# Patient Record
Sex: MALE | Race: WHITE | Employment: OTHER | URBAN - METROPOLITAN AREA
[De-identification: names, ages, dates, MRNs, and addresses within clinical notes are randomized per-mention and may not be internally consistent; named-entity substitution may affect disease eponyms.]

---

## 2018-03-13 ENCOUNTER — TELEPHONE (OUTPATIENT)
Dept: CARDIOLOGY CLINIC | Age: 73
End: 2018-03-13

## 2018-03-22 ENCOUNTER — TELEPHONE (OUTPATIENT)
Dept: CARDIOLOGY CLINIC | Age: 73
End: 2018-03-22

## 2018-03-22 NOTE — TELEPHONE ENCOUNTER
Informed pt. That we don't have samples and offered him savings cards and to apply for pt. Assistance program, he will  the cards tomorrow.

## 2018-07-16 ENCOUNTER — OFFICE VISIT (OUTPATIENT)
Dept: CARDIOLOGY CLINIC | Age: 73
End: 2018-07-16
Payer: MEDICARE

## 2018-07-16 VITALS
HEART RATE: 76 BPM | BODY MASS INDEX: 25.2 KG/M2 | RESPIRATION RATE: 14 BRPM | DIASTOLIC BLOOD PRESSURE: 76 MMHG | HEIGHT: 71 IN | WEIGHT: 180 LBS | SYSTOLIC BLOOD PRESSURE: 112 MMHG

## 2018-07-16 DIAGNOSIS — I50.22 CHRONIC SYSTOLIC CHF (CONGESTIVE HEART FAILURE) (HCC): ICD-10-CM

## 2018-07-16 DIAGNOSIS — I48.20 CHRONIC ATRIAL FIBRILLATION (HCC): Primary | Chronic | ICD-10-CM

## 2018-07-16 DIAGNOSIS — I42.7 CARDIOMYOPATHY SECONDARY TO DRUG (HCC): ICD-10-CM

## 2018-07-16 PROCEDURE — 1036F TOBACCO NON-USER: CPT | Performed by: INTERNAL MEDICINE

## 2018-07-16 PROCEDURE — 3017F COLORECTAL CA SCREEN DOC REV: CPT | Performed by: INTERNAL MEDICINE

## 2018-07-16 PROCEDURE — 1123F ACP DISCUSS/DSCN MKR DOCD: CPT | Performed by: INTERNAL MEDICINE

## 2018-07-16 PROCEDURE — 1101F PT FALLS ASSESS-DOCD LE1/YR: CPT | Performed by: INTERNAL MEDICINE

## 2018-07-16 PROCEDURE — 93000 ELECTROCARDIOGRAM COMPLETE: CPT | Performed by: INTERNAL MEDICINE

## 2018-07-16 PROCEDURE — 99214 OFFICE O/P EST MOD 30 MIN: CPT | Performed by: INTERNAL MEDICINE

## 2018-07-16 PROCEDURE — G8427 DOCREV CUR MEDS BY ELIG CLIN: HCPCS | Performed by: INTERNAL MEDICINE

## 2018-07-16 PROCEDURE — 4040F PNEUMOC VAC/ADMIN/RCVD: CPT | Performed by: INTERNAL MEDICINE

## 2018-07-16 PROCEDURE — G8419 CALC BMI OUT NRM PARAM NOF/U: HCPCS | Performed by: INTERNAL MEDICINE

## 2018-07-16 NOTE — PROGRESS NOTES
[facility]  Mana Douglas M.D., BRYCE Ray. Geovany Lloyd M.D., BRYCE Contreras M.D. Jeramie Klein M.D. Richard Renner M.D. PARKER Hernandez M.D.  Chandler Grayson. 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 He is in permanent atrial fibrillation and has had transient ischemic attacks in the past.  He is on warfarin anticoagulation and has not had any complications. His most recent INR was 2.0. Coronary arteries have been documented as being normal by catheterization. He continues to stay on Entresto and has not had any recent signs of heart failure. He only takes his furosemide if his weight goes up and he has not had to take much recently. Past Medical History:  1. Hodgkins disease documented around 1993 treated with chemotherapy only. Radiation not utilized. Currently in remission. 2. Dilated cardiomyopathy presenting with CHF, Spring 2000. Cardiac catheterization, 08/2000. Normal coronary arteries with severely impaired LV systolic function. EF 15-20%. He was treated with ACE inhibitors, beta blockers, digoxin and diuretics with improvement.    3. Echo, 04/07/2005. Mild MAC with mild MR. Mild TR with RVSP 30-35 mmHg. LV systolic and diastolic dilation  mild with mild diffuse hypokinesis. EF 40-45%. Stage I diastolic dysfunction.    4. Pulmonary fibrosis.    5. Paresthesias due to chemotherapy.    6. Cigarette abuse, 45 pack years. Quit 06/2014.    7. Exercise MPS, 07/02/2007. 12.5 METS, 75% MPHR. No chest pain or diagnostic ECG changes. Moderate LVE. EF 51%. Diaphragmatic attenuation, but no evidence for ischemia. 8. Echo, 07/16/2007.  Aortic sclerosis without stenosis or insufficiency. Normal LV size, wall motion and systolic function. Stage I diastolic dysfunction. Elevated LA pressures. Marked LAE. Moderate MALISSA. Mild TR. Doppler evidence for severe PHTN (RVSP 60-65 mmHg). 9. Echo, 06/22/2009. Normal LV size with LV systolic function at the lower limits of normal. Stage II diastolic dysfunction. Elevated LA pressures. Mild RONALD. Mild TR, severe PHTN with RVSP about 65 mmHg. 10. Allergy to aspirin. 6. [facility] [address] admission, 11/07/2014 with three to four episodes of slurred speech and left weakness consistent with TIA's. Presentation CBC, BMP, TnI x1 negative. PBNP 1606. CXR normal. CT head with no significant stenosis. MRI brain with multiple acute cerebral infarcts likely embolic in etiology. 12. Echo, 11/08/2014. LVEF 50-55%. Stage III diastolic dysfunction. Severe LAE. E/E' 16. No valvular heart disease. Mild PHTN.    13. CROW, 11/10/2014. Normal EF. No valvular heart disease. Severe SEC in the LA and LUZ. Dilated LA. No obvious thrombus seen. Low escape velocity from the LUZ. 14. Event monitor, 11/15/2014. Strips available so far reveal predominantly sinus rhythm with IVCD. Multiple PAC's and PVC's. One episode of four beats of nonsustained VT.    15. Event monitor, 12/18/2014. Significant burden of AF. AF present >21 hours on 12/13/2014 through 12/16/2014. Average HR in the 80's.    16. Patient noted to be in AF/CVR during OV 01/15/2015. 17. Echo, 06/10/2016. Normal LV size with mild CLVH. Severe global hypokinesis of the LV with estimated EF between 30-35% by biplane method of disks. E/E' ratio 15. Patient is in AF. No significant valve abnormalities noted. 18. MUGA scan, 07/01/2016, mildly dilated left ventricle with global hypokinesis. Estimated ejection fraction 42%. No wall motion abnormality.    23. Entresto therapy initiated 12/22/2016.       Review of Systems:  HEENT: negative for acute visual and auditory problems  Constitutional: negative for fever and

## 2018-08-09 ENCOUNTER — TELEPHONE (OUTPATIENT)
Dept: CARDIOLOGY CLINIC | Age: 73
End: 2018-08-09

## 2018-08-21 ENCOUNTER — TELEPHONE (OUTPATIENT)
Dept: CARDIOLOGY CLINIC | Age: 73
End: 2018-08-21

## 2018-09-10 ENCOUNTER — HOSPITAL ENCOUNTER (OUTPATIENT)
Age: 73
Discharge: HOME | End: 2018-09-12
Payer: MEDICARE

## 2018-09-10 PROCEDURE — 88173 CYTOPATH EVAL FNA REPORT: CPT

## 2018-09-10 PROCEDURE — 88184 FLOWCYTOMETRY/ TC 1 MARKER: CPT

## 2018-09-10 PROCEDURE — 88305 TISSUE EXAM BY PATHOLOGIST: CPT

## 2018-09-10 PROCEDURE — 88185 FLOWCYTOMETRY/TC ADD-ON: CPT

## 2018-09-10 PROCEDURE — 88182 CELL MARKER STUDY: CPT

## 2018-09-13 LAB
Lab: NORMAL
REPORT: NORMAL
THIS TEST SENT TO: NORMAL

## 2018-09-18 ENCOUNTER — TELEPHONE (OUTPATIENT)
Dept: CARDIOLOGY CLINIC | Age: 73
End: 2018-09-18

## 2018-09-18 NOTE — TELEPHONE ENCOUNTER
Patient calling for Entresto  samples. Per Dr. Gene Monsivais, ok to give lower dose. Please contact patient when available. Thank you.

## 2018-10-10 RX ORDER — SACUBITRIL AND VALSARTAN 97; 103 MG/1; MG/1
TABLET, FILM COATED ORAL
Qty: 60 TABLET | Refills: 5 | Status: SHIPPED | OUTPATIENT
Start: 2018-10-10 | End: 2019-04-24 | Stop reason: SDUPTHER

## 2018-10-26 ENCOUNTER — TELEPHONE (OUTPATIENT)
Dept: CARDIOLOGY CLINIC | Age: 73
End: 2018-10-26

## 2018-11-01 RX ORDER — WARFARIN SODIUM 5 MG/1
TABLET ORAL
COMMUNITY
Start: 2018-10-13

## 2018-11-08 ENCOUNTER — ANESTHESIA EVENT (OUTPATIENT)
Dept: OPERATING ROOM | Age: 73
End: 2018-11-08
Payer: MEDICARE

## 2018-11-08 ENCOUNTER — ANESTHESIA (OUTPATIENT)
Dept: OPERATING ROOM | Age: 73
End: 2018-11-08
Payer: MEDICARE

## 2018-11-08 ENCOUNTER — HOSPITAL ENCOUNTER (OUTPATIENT)
Age: 73
Setting detail: OUTPATIENT SURGERY
Discharge: HOME | End: 2018-11-08
Attending: OTOLARYNGOLOGY | Admitting: OTOLARYNGOLOGY
Payer: MEDICARE

## 2018-11-08 VITALS
TEMPERATURE: 98.6 F | HEART RATE: 128 BPM | HEIGHT: 71 IN | BODY MASS INDEX: 24.5 KG/M2 | WEIGHT: 175 LBS | DIASTOLIC BLOOD PRESSURE: 72 MMHG | SYSTOLIC BLOOD PRESSURE: 108 MMHG | RESPIRATION RATE: 18 BRPM | OXYGEN SATURATION: 95 %

## 2018-11-08 VITALS — TEMPERATURE: 97.7 F | DIASTOLIC BLOOD PRESSURE: 94 MMHG | OXYGEN SATURATION: 96 % | SYSTOLIC BLOOD PRESSURE: 134 MMHG

## 2018-11-08 DIAGNOSIS — G89.18 POST-OP PAIN: Primary | ICD-10-CM

## 2018-11-08 LAB
ANION GAP SERPL CALCULATED.3IONS-SCNC: 6 MMOL/L (ref 7–16)
APTT: 26.5 SEC (ref 24.5–35.1)
BUN BLDV-MCNC: 16 MG/DL (ref 8–23)
CALCIUM SERPL-MCNC: 8.3 MG/DL (ref 8.6–10.2)
CHLORIDE BLD-SCNC: 104 MMOL/L (ref 98–107)
CO2: 31 MMOL/L (ref 22–29)
CREAT SERPL-MCNC: 0.9 MG/DL (ref 0.7–1.2)
GFR AFRICAN AMERICAN: >60
GFR NON-AFRICAN AMERICAN: >60 ML/MIN/1.73
GLUCOSE BLD-MCNC: 86 MG/DL (ref 74–99)
HCT VFR BLD CALC: 42.2 % (ref 37–54)
HEMOGLOBIN: 13.8 G/DL (ref 12.5–16.5)
INR BLD: 1.3
MCH RBC QN AUTO: 32.7 PG (ref 26–35)
MCHC RBC AUTO-ENTMCNC: 32.7 % (ref 32–34.5)
MCV RBC AUTO: 100 FL (ref 80–99.9)
METER GLUCOSE: 78 MG/DL (ref 74–99)
PDW BLD-RTO: 14.6 FL (ref 11.5–15)
PLATELET # BLD: 182 E9/L (ref 130–450)
PMV BLD AUTO: 10.1 FL (ref 7–12)
POTASSIUM SERPL-SCNC: 4.1 MMOL/L (ref 3.5–5)
PROTHROMBIN TIME: 14.1 SEC (ref 9.3–12.4)
RBC # BLD: 4.22 E12/L (ref 3.8–5.8)
SODIUM BLD-SCNC: 141 MMOL/L (ref 132–146)
WBC # BLD: 7.6 E9/L (ref 4.5–11.5)

## 2018-11-08 PROCEDURE — 85730 THROMBOPLASTIN TIME PARTIAL: CPT

## 2018-11-08 PROCEDURE — 2500000003 HC RX 250 WO HCPCS: Performed by: OTOLARYNGOLOGY

## 2018-11-08 PROCEDURE — 2580000003 HC RX 258: Performed by: OTOLARYNGOLOGY

## 2018-11-08 PROCEDURE — 80048 BASIC METABOLIC PNL TOTAL CA: CPT

## 2018-11-08 PROCEDURE — 3600000013 HC SURGERY LEVEL 3 ADDTL 15MIN: Performed by: OTOLARYNGOLOGY

## 2018-11-08 PROCEDURE — 6360000002 HC RX W HCPCS: Performed by: NURSE ANESTHETIST, CERTIFIED REGISTERED

## 2018-11-08 PROCEDURE — 88342 IMHCHEM/IMCYTCHM 1ST ANTB: CPT

## 2018-11-08 PROCEDURE — 6360000002 HC RX W HCPCS: Performed by: OTOLARYNGOLOGY

## 2018-11-08 PROCEDURE — 85027 COMPLETE CBC AUTOMATED: CPT

## 2018-11-08 PROCEDURE — 88313 SPECIAL STAINS GROUP 2: CPT

## 2018-11-08 PROCEDURE — 3700000000 HC ANESTHESIA ATTENDED CARE: Performed by: OTOLARYNGOLOGY

## 2018-11-08 PROCEDURE — 88185 FLOWCYTOMETRY/TC ADD-ON: CPT

## 2018-11-08 PROCEDURE — 88184 FLOWCYTOMETRY/ TC 1 MARKER: CPT

## 2018-11-08 PROCEDURE — 6370000000 HC RX 637 (ALT 250 FOR IP)

## 2018-11-08 PROCEDURE — 2500000003 HC RX 250 WO HCPCS: Performed by: NURSE ANESTHETIST, CERTIFIED REGISTERED

## 2018-11-08 PROCEDURE — 6370000000 HC RX 637 (ALT 250 FOR IP): Performed by: OTOLARYNGOLOGY

## 2018-11-08 PROCEDURE — 2720000010 HC SURG SUPPLY STERILE: Performed by: OTOLARYNGOLOGY

## 2018-11-08 PROCEDURE — 3700000001 HC ADD 15 MINUTES (ANESTHESIA): Performed by: OTOLARYNGOLOGY

## 2018-11-08 PROCEDURE — 7100000011 HC PHASE II RECOVERY - ADDTL 15 MIN: Performed by: OTOLARYNGOLOGY

## 2018-11-08 PROCEDURE — 88331 PATH CONSLTJ SURG 1 BLK 1SPC: CPT

## 2018-11-08 PROCEDURE — 88182 CELL MARKER STUDY: CPT

## 2018-11-08 PROCEDURE — L0150 CERV SEMI-RIG ADJ MOLDED CHN: HCPCS | Performed by: OTOLARYNGOLOGY

## 2018-11-08 PROCEDURE — 3600000003 HC SURGERY LEVEL 3 BASE: Performed by: OTOLARYNGOLOGY

## 2018-11-08 PROCEDURE — 88307 TISSUE EXAM BY PATHOLOGIST: CPT

## 2018-11-08 PROCEDURE — 82962 GLUCOSE BLOOD TEST: CPT

## 2018-11-08 PROCEDURE — 2709999900 HC NON-CHARGEABLE SUPPLY: Performed by: OTOLARYNGOLOGY

## 2018-11-08 PROCEDURE — 2580000003 HC RX 258: Performed by: NURSE ANESTHETIST, CERTIFIED REGISTERED

## 2018-11-08 PROCEDURE — 85610 PROTHROMBIN TIME: CPT

## 2018-11-08 PROCEDURE — 7100000001 HC PACU RECOVERY - ADDTL 15 MIN: Performed by: OTOLARYNGOLOGY

## 2018-11-08 PROCEDURE — 7100000010 HC PHASE II RECOVERY - FIRST 15 MIN: Performed by: OTOLARYNGOLOGY

## 2018-11-08 PROCEDURE — 7100000000 HC PACU RECOVERY - FIRST 15 MIN: Performed by: OTOLARYNGOLOGY

## 2018-11-08 PROCEDURE — 36415 COLL VENOUS BLD VENIPUNCTURE: CPT

## 2018-11-08 PROCEDURE — 88341 IMHCHEM/IMCYTCHM EA ADD ANTB: CPT

## 2018-11-08 RX ORDER — TRAMADOL HYDROCHLORIDE 50 MG/1
50 TABLET ORAL EVERY 6 HOURS PRN
Qty: 20 TABLET | Refills: 0 | Status: SHIPPED | OUTPATIENT
Start: 2018-11-08 | End: 2018-11-13

## 2018-11-08 RX ORDER — ROCURONIUM BROMIDE 10 MG/ML
INJECTION, SOLUTION INTRAVENOUS PRN
Status: DISCONTINUED | OUTPATIENT
Start: 2018-11-08 | End: 2018-11-08 | Stop reason: SDUPTHER

## 2018-11-08 RX ORDER — SODIUM CHLORIDE 0.9 % (FLUSH) 0.9 %
10 SYRINGE (ML) INJECTION PRN
Status: DISCONTINUED | OUTPATIENT
Start: 2018-11-08 | End: 2018-11-08 | Stop reason: HOSPADM

## 2018-11-08 RX ORDER — EPHEDRINE SULFATE/0.9% NACL/PF 50 MG/5 ML
SYRINGE (ML) INTRAVENOUS PRN
Status: DISCONTINUED | OUTPATIENT
Start: 2018-11-08 | End: 2018-11-08 | Stop reason: SDUPTHER

## 2018-11-08 RX ORDER — LIDOCAINE HYDROCHLORIDE AND EPINEPHRINE 10; 10 MG/ML; UG/ML
INJECTION, SOLUTION INFILTRATION; PERINEURAL PRN
Status: DISCONTINUED | OUTPATIENT
Start: 2018-11-08 | End: 2018-11-08 | Stop reason: HOSPADM

## 2018-11-08 RX ORDER — DEXAMETHASONE SODIUM PHOSPHATE 4 MG/ML
INJECTION, SOLUTION INTRA-ARTICULAR; INTRALESIONAL; INTRAMUSCULAR; INTRAVENOUS; SOFT TISSUE PRN
Status: DISCONTINUED | OUTPATIENT
Start: 2018-11-08 | End: 2018-11-08 | Stop reason: SDUPTHER

## 2018-11-08 RX ORDER — SODIUM CHLORIDE, SODIUM LACTATE, POTASSIUM CHLORIDE, CALCIUM CHLORIDE 600; 310; 30; 20 MG/100ML; MG/100ML; MG/100ML; MG/100ML
INJECTION, SOLUTION INTRAVENOUS CONTINUOUS
Status: DISCONTINUED | OUTPATIENT
Start: 2018-11-08 | End: 2018-11-08 | Stop reason: HOSPADM

## 2018-11-08 RX ORDER — ONDANSETRON 2 MG/ML
INJECTION INTRAMUSCULAR; INTRAVENOUS PRN
Status: DISCONTINUED | OUTPATIENT
Start: 2018-11-08 | End: 2018-11-08 | Stop reason: SDUPTHER

## 2018-11-08 RX ORDER — MEPERIDINE HYDROCHLORIDE 25 MG/ML
12.5 INJECTION INTRAMUSCULAR; INTRAVENOUS; SUBCUTANEOUS EVERY 5 MIN PRN
Status: DISCONTINUED | OUTPATIENT
Start: 2018-11-08 | End: 2018-11-08 | Stop reason: HOSPADM

## 2018-11-08 RX ORDER — GLYCOPYRROLATE 0.2 MG/ML
INJECTION INTRAMUSCULAR; INTRAVENOUS PRN
Status: DISCONTINUED | OUTPATIENT
Start: 2018-11-08 | End: 2018-11-08 | Stop reason: SDUPTHER

## 2018-11-08 RX ORDER — ONDANSETRON 2 MG/ML
4 INJECTION INTRAMUSCULAR; INTRAVENOUS
Status: DISCONTINUED | OUTPATIENT
Start: 2018-11-08 | End: 2018-11-08 | Stop reason: HOSPADM

## 2018-11-08 RX ORDER — LIDOCAINE HYDROCHLORIDE 20 MG/ML
INJECTION, SOLUTION INFILTRATION; PERINEURAL PRN
Status: DISCONTINUED | OUTPATIENT
Start: 2018-11-08 | End: 2018-11-08 | Stop reason: SDUPTHER

## 2018-11-08 RX ORDER — FENTANYL CITRATE 50 UG/ML
INJECTION, SOLUTION INTRAMUSCULAR; INTRAVENOUS PRN
Status: DISCONTINUED | OUTPATIENT
Start: 2018-11-08 | End: 2018-11-08 | Stop reason: SDUPTHER

## 2018-11-08 RX ORDER — CEFAZOLIN SODIUM 2 G/50ML
2 SOLUTION INTRAVENOUS
Status: COMPLETED | OUTPATIENT
Start: 2018-11-08 | End: 2018-11-08

## 2018-11-08 RX ORDER — DIAPER,BRIEF,INFANT-TODD,DISP
EACH MISCELLANEOUS PRN
Status: DISCONTINUED | OUTPATIENT
Start: 2018-11-08 | End: 2018-11-08 | Stop reason: HOSPADM

## 2018-11-08 RX ORDER — CEPHALEXIN 500 MG/1
500 CAPSULE ORAL 3 TIMES DAILY
Qty: 21 CAPSULE | Refills: 0 | Status: SHIPPED | OUTPATIENT
Start: 2018-11-08 | End: 2018-11-15

## 2018-11-08 RX ORDER — MIDAZOLAM HYDROCHLORIDE 1 MG/ML
INJECTION INTRAMUSCULAR; INTRAVENOUS PRN
Status: DISCONTINUED | OUTPATIENT
Start: 2018-11-08 | End: 2018-11-08 | Stop reason: SDUPTHER

## 2018-11-08 RX ORDER — ONDANSETRON 4 MG/1
4 TABLET, FILM COATED ORAL DAILY PRN
Qty: 21 TABLET | Refills: 0 | Status: SHIPPED | OUTPATIENT
Start: 2018-11-08 | End: 2019-02-04 | Stop reason: ALTCHOICE

## 2018-11-08 RX ORDER — FENTANYL CITRATE 50 UG/ML
25 INJECTION, SOLUTION INTRAMUSCULAR; INTRAVENOUS EVERY 5 MIN PRN
Status: DISCONTINUED | OUTPATIENT
Start: 2018-11-08 | End: 2018-11-08 | Stop reason: HOSPADM

## 2018-11-08 RX ORDER — PROPOFOL 10 MG/ML
INJECTION, EMULSION INTRAVENOUS PRN
Status: DISCONTINUED | OUTPATIENT
Start: 2018-11-08 | End: 2018-11-08 | Stop reason: SDUPTHER

## 2018-11-08 RX ORDER — FENTANYL CITRATE 50 UG/ML
50 INJECTION, SOLUTION INTRAMUSCULAR; INTRAVENOUS EVERY 5 MIN PRN
Status: DISCONTINUED | OUTPATIENT
Start: 2018-11-08 | End: 2018-11-08 | Stop reason: HOSPADM

## 2018-11-08 RX ORDER — SODIUM CHLORIDE 0.9 % (FLUSH) 0.9 %
10 SYRINGE (ML) INJECTION EVERY 12 HOURS SCHEDULED
Status: DISCONTINUED | OUTPATIENT
Start: 2018-11-08 | End: 2018-11-08 | Stop reason: HOSPADM

## 2018-11-08 RX ORDER — SODIUM CHLORIDE 9 MG/ML
INJECTION, SOLUTION INTRAVENOUS CONTINUOUS PRN
Status: DISCONTINUED | OUTPATIENT
Start: 2018-11-08 | End: 2018-11-08 | Stop reason: SDUPTHER

## 2018-11-08 RX ADMIN — Medication 10 MG: at 09:51

## 2018-11-08 RX ADMIN — PHENYLEPHRINE HYDROCHLORIDE 100 MCG: 10 INJECTION INTRAVENOUS at 09:46

## 2018-11-08 RX ADMIN — FENTANYL CITRATE 50 MCG: 50 INJECTION, SOLUTION INTRAMUSCULAR; INTRAVENOUS at 10:18

## 2018-11-08 RX ADMIN — SODIUM CHLORIDE: 9 INJECTION, SOLUTION INTRAVENOUS at 08:52

## 2018-11-08 RX ADMIN — ONDANSETRON HYDROCHLORIDE 4 MG: 2 INJECTION, SOLUTION INTRAMUSCULAR; INTRAVENOUS at 10:45

## 2018-11-08 RX ADMIN — GLYCOPYRROLATE 0.6 MG: 0.2 INJECTION, SOLUTION INTRAMUSCULAR; INTRAVENOUS at 10:55

## 2018-11-08 RX ADMIN — MIDAZOLAM HYDROCHLORIDE 2 MG: 1 INJECTION, SOLUTION INTRAMUSCULAR; INTRAVENOUS at 08:52

## 2018-11-08 RX ADMIN — PHENYLEPHRINE HYDROCHLORIDE 100 MCG: 10 INJECTION INTRAVENOUS at 09:48

## 2018-11-08 RX ADMIN — ROCURONIUM BROMIDE 40 MG: 10 SOLUTION INTRAVENOUS at 09:05

## 2018-11-08 RX ADMIN — LIDOCAINE HYDROCHLORIDE 60 MG: 20 INJECTION, SOLUTION INFILTRATION; PERINEURAL at 09:05

## 2018-11-08 RX ADMIN — Medication 10 MG: at 10:36

## 2018-11-08 RX ADMIN — PHENYLEPHRINE HYDROCHLORIDE 100 MCG: 10 INJECTION INTRAVENOUS at 09:27

## 2018-11-08 RX ADMIN — CEFAZOLIN SODIUM 2 G: 2 SOLUTION INTRAVENOUS at 08:52

## 2018-11-08 RX ADMIN — Medication 10 MG: at 09:57

## 2018-11-08 RX ADMIN — SODIUM CHLORIDE: 9 INJECTION, SOLUTION INTRAVENOUS at 10:58

## 2018-11-08 RX ADMIN — FENTANYL CITRATE 50 MCG: 50 INJECTION, SOLUTION INTRAMUSCULAR; INTRAVENOUS at 09:05

## 2018-11-08 RX ADMIN — PHENYLEPHRINE HYDROCHLORIDE 100 MCG: 10 INJECTION INTRAVENOUS at 09:21

## 2018-11-08 RX ADMIN — PROPOFOL 150 MG: 10 INJECTION, EMULSION INTRAVENOUS at 09:05

## 2018-11-08 RX ADMIN — SODIUM CHLORIDE, POTASSIUM CHLORIDE, SODIUM LACTATE AND CALCIUM CHLORIDE: 600; 310; 30; 20 INJECTION, SOLUTION INTRAVENOUS at 07:46

## 2018-11-08 RX ADMIN — Medication 10 MG: at 10:34

## 2018-11-08 RX ADMIN — Medication 10 MG: at 09:55

## 2018-11-08 RX ADMIN — DEXAMETHASONE SODIUM PHOSPHATE 8 MG: 4 INJECTION, SOLUTION INTRAMUSCULAR; INTRAVENOUS at 09:05

## 2018-11-08 ASSESSMENT — PULMONARY FUNCTION TESTS
PIF_VALUE: 0
PIF_VALUE: 11
PIF_VALUE: 0
PIF_VALUE: 0
PIF_VALUE: 17
PIF_VALUE: 16
PIF_VALUE: 16
PIF_VALUE: 9
PIF_VALUE: 0
PIF_VALUE: 15
PIF_VALUE: 16
PIF_VALUE: 15
PIF_VALUE: 17
PIF_VALUE: 14
PIF_VALUE: 3
PIF_VALUE: 20
PIF_VALUE: 17
PIF_VALUE: 18
PIF_VALUE: 11
PIF_VALUE: 2
PIF_VALUE: 18
PIF_VALUE: 15
PIF_VALUE: 16
PIF_VALUE: 14
PIF_VALUE: 0
PIF_VALUE: 15
PIF_VALUE: 17
PIF_VALUE: 11
PIF_VALUE: 13
PIF_VALUE: 18
PIF_VALUE: 18
PIF_VALUE: 15
PIF_VALUE: 14
PIF_VALUE: 15
PIF_VALUE: 11
PIF_VALUE: 28
PIF_VALUE: 3
PIF_VALUE: 19
PIF_VALUE: 15
PIF_VALUE: 1
PIF_VALUE: 11
PIF_VALUE: 13
PIF_VALUE: 11
PIF_VALUE: 17
PIF_VALUE: 16
PIF_VALUE: 15
PIF_VALUE: 16
PIF_VALUE: 14
PIF_VALUE: 17
PIF_VALUE: 11
PIF_VALUE: 18
PIF_VALUE: 16
PIF_VALUE: 11
PIF_VALUE: 15
PIF_VALUE: 11
PIF_VALUE: 19
PIF_VALUE: 17
PIF_VALUE: 17
PIF_VALUE: 11
PIF_VALUE: 15
PIF_VALUE: 16
PIF_VALUE: 11
PIF_VALUE: 14
PIF_VALUE: 0
PIF_VALUE: 11
PIF_VALUE: 16
PIF_VALUE: 0
PIF_VALUE: 0
PIF_VALUE: 11
PIF_VALUE: 10
PIF_VALUE: 20
PIF_VALUE: 0
PIF_VALUE: 11
PIF_VALUE: 14
PIF_VALUE: 15
PIF_VALUE: 16
PIF_VALUE: 15
PIF_VALUE: 3
PIF_VALUE: 11
PIF_VALUE: 15
PIF_VALUE: 11
PIF_VALUE: 17
PIF_VALUE: 11
PIF_VALUE: 22
PIF_VALUE: 16
PIF_VALUE: 15
PIF_VALUE: 17
PIF_VALUE: 0
PIF_VALUE: 18
PIF_VALUE: 11
PIF_VALUE: 14
PIF_VALUE: 11
PIF_VALUE: 11
PIF_VALUE: 17
PIF_VALUE: 16
PIF_VALUE: 18
PIF_VALUE: 14
PIF_VALUE: 11
PIF_VALUE: 15
PIF_VALUE: 15
PIF_VALUE: 14
PIF_VALUE: 16
PIF_VALUE: 15
PIF_VALUE: 2
PIF_VALUE: 11
PIF_VALUE: 15
PIF_VALUE: 1
PIF_VALUE: 17
PIF_VALUE: 19
PIF_VALUE: 1
PIF_VALUE: 15
PIF_VALUE: 11
PIF_VALUE: 16
PIF_VALUE: 11
PIF_VALUE: 18
PIF_VALUE: 14
PIF_VALUE: 20
PIF_VALUE: 17
PIF_VALUE: 17
PIF_VALUE: 16
PIF_VALUE: 15
PIF_VALUE: 11
PIF_VALUE: 15
PIF_VALUE: 15
PIF_VALUE: 0
PIF_VALUE: 11
PIF_VALUE: 1
PIF_VALUE: 0
PIF_VALUE: 10
PIF_VALUE: 12
PIF_VALUE: 14
PIF_VALUE: 15
PIF_VALUE: 0
PIF_VALUE: 13
PIF_VALUE: 17
PIF_VALUE: 1
PIF_VALUE: 18
PIF_VALUE: 11
PIF_VALUE: 2
PIF_VALUE: 15
PIF_VALUE: 11
PIF_VALUE: 0
PIF_VALUE: 16
PIF_VALUE: 15
PIF_VALUE: 14
PIF_VALUE: 11
PIF_VALUE: 13
PIF_VALUE: 15
PIF_VALUE: 19
PIF_VALUE: 11
PIF_VALUE: 15
PIF_VALUE: 15

## 2018-11-08 ASSESSMENT — PAIN - FUNCTIONAL ASSESSMENT: PAIN_FUNCTIONAL_ASSESSMENT: 0-10

## 2018-11-08 ASSESSMENT — PAIN SCALES - GENERAL
PAINLEVEL_OUTOF10: 0

## 2018-11-08 NOTE — ANESTHESIA PRE PROCEDURE
consumption: 11/07/18                        Date of last solid food consumption: 11/07/18    BMI:   Wt Readings from Last 3 Encounters:   11/01/18 175 lb (79.4 kg)   07/16/18 180 lb (81.6 kg)   01/31/18 179 lb (81.2 kg)     Body mass index is 24.41 kg/m². CBC:   Lab Results   Component Value Date    WBC 7.6 11/08/2018    RBC 4.22 11/08/2018    HGB 13.8 11/08/2018    HCT 42.2 11/08/2018    .0 11/08/2018    RDW 14.6 11/08/2018     11/08/2018       CMP:   Lab Results   Component Value Date     11/09/2014    K 4.0 11/09/2014    CL 99 11/09/2014    CO2 29 11/09/2014    BUN 13 11/09/2014    CREATININE 0.9 11/09/2014    GFRAA >60 11/09/2014    LABGLOM >60 11/09/2014    GLUCOSE 107 11/09/2014    PROT 6.9 11/09/2014    CALCIUM 9.0 11/09/2014    BILITOT 0.6 11/09/2014    ALKPHOS 81 11/09/2014    AST 13 11/09/2014    ALT 10 11/09/2014       POC Tests: No results for input(s): POCGLU, POCNA, POCK, POCCL, POCBUN, POCHEMO, POCHCT in the last 72 hours.     Coags:   Lab Results   Component Value Date    PROTIME 11.4 11/11/2014    INR 1.1 11/11/2014    APTT 27.0 11/07/2014       HCG (If Applicable): No results found for: PREGTESTUR, PREGSERUM, HCG, HCGQUANT     ABGs: No results found for: PHART, PO2ART, MHX9SRG, DGU9WQO, BEART, D5ZCSUYM     Type & Screen (If Applicable):  No results found for: LABABO, [address]    Anesthesia Evaluation  Patient summary reviewed  Airway: Mallampati: III  TM distance: >3 FB   Neck ROM: full  Mouth opening: > = 3 FB Dental: normal exam         Pulmonary:Negative Pulmonary ROS and normal exam                               Cardiovascular:    (+) hypertension:, CHF:,                 Cleared by cardiology     Beta Blocker:  Dose within 24 Hrs         Neuro/Psych:   (+) TIA,             GI/Hepatic/Renal:             Endo/Other:    (+) DiabetesType II DM, using insulin, hypothyroidism::., .                 Abdominal:           Vascular:                                        Anesthesia Plan      general     ASA 3       Induction: intravenous. Anesthetic plan and risks discussed with patient. Plan discussed with CRNA.                   Teri Gee MD   11/8/2018

## 2018-11-08 NOTE — H&P
The H&P has been reviewed, the patient has been examined, and I concur with the findings of the 11/2018 H&P.

## 2018-11-15 LAB
Lab: NORMAL
REPORT: NORMAL
THIS TEST SENT TO: NORMAL

## 2018-11-19 ENCOUNTER — TELEPHONE (OUTPATIENT)
Dept: CARDIOLOGY CLINIC | Age: 73
End: 2018-11-19

## 2018-12-13 ENCOUNTER — TELEPHONE (OUTPATIENT)
Dept: ADMINISTRATIVE | Age: 73
End: 2018-12-13

## 2019-01-03 ENCOUNTER — TELEPHONE (OUTPATIENT)
Dept: CARDIOLOGY CLINIC | Age: 74
End: 2019-01-03

## 2019-01-09 ENCOUNTER — TELEPHONE (OUTPATIENT)
Dept: CARDIOLOGY CLINIC | Age: 74
End: 2019-01-09

## 2019-02-04 ENCOUNTER — OFFICE VISIT (OUTPATIENT)
Dept: CARDIOLOGY CLINIC | Age: 74
End: 2019-02-04
Payer: MEDICARE

## 2019-02-04 VITALS
RESPIRATION RATE: 16 BRPM | HEIGHT: 71 IN | BODY MASS INDEX: 26.53 KG/M2 | HEART RATE: 81 BPM | WEIGHT: 189.5 LBS | SYSTOLIC BLOOD PRESSURE: 136 MMHG | DIASTOLIC BLOOD PRESSURE: 74 MMHG

## 2019-02-04 DIAGNOSIS — I42.7 CARDIOMYOPATHY SECONDARY TO DRUG (HCC): ICD-10-CM

## 2019-02-04 DIAGNOSIS — I48.20 CHRONIC ATRIAL FIBRILLATION (HCC): Primary | Chronic | ICD-10-CM

## 2019-02-04 DIAGNOSIS — I50.22 CHRONIC SYSTOLIC CHF (CONGESTIVE HEART FAILURE) (HCC): ICD-10-CM

## 2019-02-04 PROCEDURE — 3017F COLORECTAL CA SCREEN DOC REV: CPT | Performed by: INTERNAL MEDICINE

## 2019-02-04 PROCEDURE — 1123F ACP DISCUSS/DSCN MKR DOCD: CPT | Performed by: INTERNAL MEDICINE

## 2019-02-04 PROCEDURE — 4040F PNEUMOC VAC/ADMIN/RCVD: CPT | Performed by: INTERNAL MEDICINE

## 2019-02-04 PROCEDURE — G8484 FLU IMMUNIZE NO ADMIN: HCPCS | Performed by: INTERNAL MEDICINE

## 2019-02-04 PROCEDURE — 99213 OFFICE O/P EST LOW 20 MIN: CPT | Performed by: INTERNAL MEDICINE

## 2019-02-04 PROCEDURE — G8427 DOCREV CUR MEDS BY ELIG CLIN: HCPCS | Performed by: INTERNAL MEDICINE

## 2019-02-04 PROCEDURE — 1101F PT FALLS ASSESS-DOCD LE1/YR: CPT | Performed by: INTERNAL MEDICINE

## 2019-02-04 PROCEDURE — 1036F TOBACCO NON-USER: CPT | Performed by: INTERNAL MEDICINE

## 2019-02-04 PROCEDURE — 93000 ELECTROCARDIOGRAM COMPLETE: CPT | Performed by: INTERNAL MEDICINE

## 2019-02-04 PROCEDURE — G8419 CALC BMI OUT NRM PARAM NOF/U: HCPCS | Performed by: INTERNAL MEDICINE

## 2019-02-04 RX ORDER — LEVOTHYROXINE SODIUM 0.07 MG/1
75 TABLET ORAL DAILY
COMMUNITY
Start: 2019-01-10

## 2019-02-04 RX ORDER — PEN NEEDLE, DIABETIC 31 GX3/16"
NEEDLE, DISPOSABLE MISCELLANEOUS
Refills: 3 | COMMUNITY
Start: 2019-01-15 | End: 2022-07-23

## 2019-02-04 RX ORDER — LANCETS 33 GAUGE
EACH MISCELLANEOUS
Refills: 0 | COMMUNITY
Start: 2019-01-22 | End: 2022-07-23

## 2019-02-06 ENCOUNTER — TELEPHONE (OUTPATIENT)
Dept: CARDIOLOGY CLINIC | Age: 74
End: 2019-02-06

## 2019-03-08 ENCOUNTER — TELEPHONE (OUTPATIENT)
Dept: CARDIOLOGY CLINIC | Age: 74
End: 2019-03-08

## 2019-07-29 ENCOUNTER — OFFICE VISIT (OUTPATIENT)
Dept: CARDIOLOGY CLINIC | Age: 74
End: 2019-07-29
Payer: MEDICARE

## 2019-07-29 VITALS
HEART RATE: 61 BPM | HEIGHT: 71 IN | DIASTOLIC BLOOD PRESSURE: 80 MMHG | BODY MASS INDEX: 26.1 KG/M2 | RESPIRATION RATE: 16 BRPM | SYSTOLIC BLOOD PRESSURE: 118 MMHG | WEIGHT: 186.4 LBS

## 2019-07-29 DIAGNOSIS — I48.20 CHRONIC ATRIAL FIBRILLATION (HCC): Primary | ICD-10-CM

## 2019-07-29 PROCEDURE — 93000 ELECTROCARDIOGRAM COMPLETE: CPT | Performed by: INTERNAL MEDICINE

## 2019-07-29 PROCEDURE — 4040F PNEUMOC VAC/ADMIN/RCVD: CPT | Performed by: INTERNAL MEDICINE

## 2019-07-29 PROCEDURE — 99214 OFFICE O/P EST MOD 30 MIN: CPT | Performed by: INTERNAL MEDICINE

## 2019-07-29 PROCEDURE — 3017F COLORECTAL CA SCREEN DOC REV: CPT | Performed by: INTERNAL MEDICINE

## 2019-07-29 PROCEDURE — G8427 DOCREV CUR MEDS BY ELIG CLIN: HCPCS | Performed by: INTERNAL MEDICINE

## 2019-07-29 PROCEDURE — G8419 CALC BMI OUT NRM PARAM NOF/U: HCPCS | Performed by: INTERNAL MEDICINE

## 2019-07-29 PROCEDURE — 1123F ACP DISCUSS/DSCN MKR DOCD: CPT | Performed by: INTERNAL MEDICINE

## 2019-07-29 PROCEDURE — 1036F TOBACCO NON-USER: CPT | Performed by: INTERNAL MEDICINE

## 2019-07-29 NOTE — PROGRESS NOTES
valvular heart disease. Mild PHTN.    13. CROW, 11/10/2014. Normal EF. No valvular heart disease. Severe SEC in the LA and LUZ. Dilated LA. No obvious thrombus seen. Low escape velocity from the LUZ. 14. Event monitor, 11/15/2014. Strips available so far reveal predominantly sinus rhythm with IVCD. Multiple PAC's and PVC's. One episode of four beats of nonsustained VT.    15. Event monitor, 12/18/2014. Significant burden of AF. AF present >21 hours on 12/13/2014 through 12/16/2014. Average HR in the 80's.    16. Patient noted to be in AF/CVR during OV 01/15/2015. 17. Echo, 06/10/2016. Normal LV size with mild CLVH. Severe global hypokinesis of the LV with estimated EF between 30-35% by biplane method of disks. E/E' ratio 15. Patient is in AF. No significant valve abnormalities noted. 18. MUGA scan, 07/01/2016, mildly dilated left ventricle with global hypokinesis. Estimated ejection fraction 42%. No wall motion abnormality. 19. Entresto therapy initiated 12/22/2016.      Past Medical History:   Diagnosis Date    Anticoagulant long-term use     Atrial fibrillation (HCC)     CHF (congestive heart failure) ([address] 2000    Diabetes ([address]     Diastolic dysfunction     Dilated cardiomyopathy ([address]     Hodgkin's disease ([address] 1993    treated with chemo    Hx of TIA (transient ischemic attack) and stroke     Hyperlipidemia     Hypertension     Hypothyroidism     Pulmonary fibrosis ([address]        Patient Active Problem List   Diagnosis    Dilated cardiomyopathy ([address]    TIA (transient ischemic attack)    HTN (hypertension)    Tobacco abuse    Chronic atrial fibrillation (HCC)    Chronic systolic CHF (congestive heart failure) (HCC)    Acute on chronic systolic CHF (congestive heart failure) ([address]    Cardiomyopathy secondary to drug ([address]       Allergies   Allergen Reactions    Asa [Aspirin] Hives       Current Outpatient Medications   Medication Sig Dispense Refill    sacubitril-valsartan (ENTRESTO)  MG per tablet TAKE ONE TABLET BY MOUTH TWICE A DAY 60 tablet 5    levothyroxine (SYNTHROID) 75 MCG tablet       SURE COMFORT PEN NEEDLES 31G X 8 MM MISC USE WITH INSULIN AS DIRECTED ONCE A DAY  3    ONETOUCH DELICA LANCETS 62Q MISC USE TO TEST BLOOD GLUCOSE THREE TIMES DAILY  0    warfarin (COUMADIN) 5 MG tablet       torsemide (DEMADEX) 20 MG tablet Take 1 tablet by mouth every day 90 tablet 3    carvedilol (COREG) 25 MG tablet Take 1 tablet by mouth twice a day with meals 180 tablet 3    ONETOUCH VERIO strip TESTING TID  11    insulin glargine (LANTUS) 100 UNIT/ML injection vial Inject 24 Units into the skin daily       levothyroxine (SYNTHROID) 50 MCG tablet Take 75 mcg by mouth Daily        No current facility-administered medications for this visit.         Social History     Socioeconomic History    Marital status:      Spouse name: Not on file    Number of children: Not on file    Years of education: Not on file    Highest education level: Not on file   Occupational History    Not on file   Social Needs    Financial resource strain: Not on file    Food insecurity:     Worry: Not on file     Inability: Not on file    Transportation needs:     Medical: Not on file     Non-medical: Not on file   Tobacco Use    Smoking status: Former Smoker     Packs/day: 1.50     Years: 30.00     Pack years: 45.00     Types: Cigarettes     Last attempt to quit: 2014     Years since quittin.1    Smokeless tobacco: Never Used   Substance and Sexual Activity    Alcohol use: No     Alcohol/week: 0.0 standard drinks    Drug use: No    Sexual activity: Not on file   Lifestyle    Physical activity:     Days per week: Not on file     Minutes per session: Not on file    Stress: Not on file   Relationships    Social connections:     Talks on phone: Not on file     Gets together: Not on file     Attends [employer] service: Not on file     Active member of club or organization: Not on file     Attends meetings of clubs or organizations: Not on file     Relationship status: Not on file    Intimate partner violence:     Fear of current or ex partner: Not on file     Emotionally abused: Not on file     Physically abused: Not on file     Forced sexual activity: Not on file   Other Topics Concern    Not on file   Social History Narrative    Not on file       Family History   Problem Relation Age of Onset    Cancer Mother     Heart Disease Father        Review of Systems:  Heart: as above   Lungs: as above   Eyes: denies changes in vision or discharge. Ears: denies changes in hearing or pain. Nose: denies epistaxis or masses   Throat: denies sore throat or trouble swallowing. Neuro: denies numbness, tingling, tremors. Skin: denies rashes or itching. : denies hematuria, dysuria   GI: denies vomiting, diarrhea   Psych: denies mood changed, anxiety, depression. All other systems negative. Physical Exam   /80   Pulse 61   Resp 16   Ht 5' 11\" (1.803 m)   Wt 186 lb 6.4 oz (84.6 kg)   BMI 26.00 kg/m²   Constitutional: Oriented to person, place, and time. Well-developed and well-nourished. No distress. Head: Normocephalic and atraumatic. Eyes: EOM are normal. Pupils are equal, round, and reactive to light. Neck: Normal range of motion. Neck supple. No hepatojugular reflux and no JVD present. Carotid bruit is not present. No tracheal deviation present. No thyromegaly present. Cardiovascular: Normal rate, irregular rhythm, normal heart sounds and intact distal pulses. Exam reveals no gallop and no friction rub. No murmur heard. Pulmonary/Chest: Effort normal and breath sounds normal. No respiratory distress. No wheezes. No rales. No tenderness. Abdominal: Soft. Bowel sounds are normal. No distension and no mass. No tenderness. No rebound and no guarding. Musculoskeletal: Normal range of motion. No edema and no tenderness.    Lymphadenopathy:   No cervical

## 2019-08-14 ENCOUNTER — TELEPHONE (OUTPATIENT)
Dept: CARDIOLOGY CLINIC | Age: 74
End: 2019-08-14

## 2019-08-20 DIAGNOSIS — I42.7 CARDIOMYOPATHY SECONDARY TO DRUG (HCC): ICD-10-CM

## 2019-08-20 RX ORDER — TORSEMIDE 20 MG/1
TABLET ORAL
Qty: 90 TABLET | Refills: 3 | Status: SHIPPED
Start: 2019-08-20 | End: 2021-01-22 | Stop reason: SDUPTHER

## 2019-08-20 RX ORDER — CARVEDILOL 25 MG/1
TABLET ORAL
Qty: 180 TABLET | Refills: 3 | Status: SHIPPED
Start: 2019-08-20 | End: 2020-07-23

## 2020-01-14 ENCOUNTER — TELEPHONE (OUTPATIENT)
Dept: CARDIOLOGY CLINIC | Age: 75
End: 2020-01-14

## 2020-01-14 NOTE — TELEPHONE ENCOUNTER
Called patient to inform him that we are currently out of Entresto samples and that I will call him back as soon as we get more in.  Damaris Abraham MA.

## 2020-02-04 ENCOUNTER — OFFICE VISIT (OUTPATIENT)
Dept: CARDIOLOGY CLINIC | Age: 75
End: 2020-02-04
Payer: MEDICARE

## 2020-02-04 VITALS
HEART RATE: 67 BPM | HEIGHT: 70 IN | BODY MASS INDEX: 26.63 KG/M2 | DIASTOLIC BLOOD PRESSURE: 82 MMHG | WEIGHT: 186 LBS | SYSTOLIC BLOOD PRESSURE: 138 MMHG

## 2020-02-04 PROCEDURE — 1036F TOBACCO NON-USER: CPT | Performed by: INTERNAL MEDICINE

## 2020-02-04 PROCEDURE — 1123F ACP DISCUSS/DSCN MKR DOCD: CPT | Performed by: INTERNAL MEDICINE

## 2020-02-04 PROCEDURE — 3017F COLORECTAL CA SCREEN DOC REV: CPT | Performed by: INTERNAL MEDICINE

## 2020-02-04 PROCEDURE — G8417 CALC BMI ABV UP PARAM F/U: HCPCS | Performed by: INTERNAL MEDICINE

## 2020-02-04 PROCEDURE — G8427 DOCREV CUR MEDS BY ELIG CLIN: HCPCS | Performed by: INTERNAL MEDICINE

## 2020-02-04 PROCEDURE — 99214 OFFICE O/P EST MOD 30 MIN: CPT | Performed by: INTERNAL MEDICINE

## 2020-02-04 PROCEDURE — 93000 ELECTROCARDIOGRAM COMPLETE: CPT | Performed by: INTERNAL MEDICINE

## 2020-02-04 PROCEDURE — G8484 FLU IMMUNIZE NO ADMIN: HCPCS | Performed by: INTERNAL MEDICINE

## 2020-02-04 PROCEDURE — 4040F PNEUMOC VAC/ADMIN/RCVD: CPT | Performed by: INTERNAL MEDICINE

## 2020-02-04 NOTE — PROGRESS NOTES
MG per tablet TAKE ONE TABLET BY MOUTH TWO TIMES A DAY 60 tablet 4    carvedilol (COREG) 25 MG tablet Take 1 tablet by mouth twice a day with meals 180 tablet 3    torsemide (DEMADEX) 20 MG tablet Take 1 tablet by mouth every day 90 tablet 3    levothyroxine (SYNTHROID) 75 MCG tablet       SURE COMFORT PEN NEEDLES 31G X 8 MM MISC USE WITH INSULIN AS DIRECTED ONCE A DAY  3    ONETOUCH DELICA LANCETS 96T MISC USE TO TEST BLOOD GLUCOSE THREE TIMES DAILY  0    warfarin (COUMADIN) 5 MG tablet       ONETOUCH VERIO strip TESTING TID  11    insulin glargine (LANTUS) 100 UNIT/ML injection vial Inject 28 Units into the skin daily       sacubitril-valsartan (ENTRESTO)  MG per tablet TAKE ONE TABLET BY MOUTH TWICE A DAY (Patient not taking: Reported on 2020) 60 tablet 5     No current facility-administered medications for this visit.         Social History     Socioeconomic History    Marital status:      Spouse name: Not on file    Number of children: Not on file    Years of education: Not on file    Highest education level: Not on file   Occupational History    Not on file   Social Needs    Financial resource strain: Not on file    Food insecurity:     Worry: Not on file     Inability: Not on file    Transportation needs:     Medical: Not on file     Non-medical: Not on file   Tobacco Use    Smoking status: Former Smoker     Packs/day: 1.50     Years: 30.00     Pack years: 45.00     Types: Cigarettes     Last attempt to quit: 2014     Years since quittin.6    Smokeless tobacco: Never Used   Substance and Sexual Activity    Alcohol use: No     Alcohol/week: 0.0 standard drinks    Drug use: No    Sexual activity: Not on file   Lifestyle    Physical activity:     Days per week: Not on file     Minutes per session: Not on file    Stress: Not on file   Relationships    Social connections:     Talks on phone: Not on file     Gets together: Not on file     Attends [employer] tenderness. Lymphadenopathy:   No cervical adenopathy. No groin adenopathy. Neurological: Alert and oriented to person, place, and time. Skin: Skin is warm and dry. No rash noted. Not diaphoretic. No erythema. Psychiatric: Normal mood and affect. Behavior is normal.     EKG:  nonspecific ST and T waves changes, atrial fibrillation. ASSESSMENT AND PLAN:  Patient Active Problem List   Diagnosis    [facility]    TIA (transient ischemic attack)    HTN (hypertension)    Tobacco abuse    Chronic atrial fibrillation    Chronic systolic CHF (congestive heart failure) (HCC)    Acute on chronic systolic CHF (congestive heart failure) ([facility]    [facility]omyopathy secondary [facility]     1. CMP/CHF: Volume good. Continue coreg/entresto/demadex. 2. Afib: BB/warfarin. 3. HTN: Observe. 4. Pulm Fibrosis. Saurav Mast D.O.   Cardiologist  Cardiology, [address]

## 2020-05-27 RX ORDER — SACUBITRIL AND VALSARTAN 97; 103 MG/1; MG/1
TABLET, FILM COATED ORAL
Qty: 60 TABLET | Refills: 5 | Status: SHIPPED
Start: 2020-05-27 | End: 2021-01-13

## 2020-07-23 RX ORDER — CARVEDILOL 25 MG/1
TABLET ORAL
Qty: 180 TABLET | Refills: 2 | Status: SHIPPED
Start: 2020-07-23 | End: 2021-01-22 | Stop reason: SDUPTHER

## 2020-10-16 ENCOUNTER — APPOINTMENT (OUTPATIENT)
Dept: CT IMAGING | Age: 75
End: 2020-10-16
Payer: MEDICARE

## 2020-10-16 ENCOUNTER — HOSPITAL ENCOUNTER (EMERGENCY)
Age: 75
Discharge: HOME | End: 2020-10-16
Attending: EMERGENCY MEDICINE
Payer: MEDICARE

## 2020-10-16 VITALS
RESPIRATION RATE: 16 BRPM | HEART RATE: 57 BPM | TEMPERATURE: 97.5 F | WEIGHT: 175 LBS | DIASTOLIC BLOOD PRESSURE: 73 MMHG | SYSTOLIC BLOOD PRESSURE: 142 MMHG | BODY MASS INDEX: 24.5 KG/M2 | OXYGEN SATURATION: 96 % | HEIGHT: 71 IN

## 2020-10-16 PROCEDURE — 71250 CT THORAX DX C-: CPT

## 2020-10-16 PROCEDURE — 99283 EMERGENCY DEPT VISIT LOW MDM: CPT

## 2020-10-16 ASSESSMENT — PAIN DESCRIPTION - FREQUENCY: FREQUENCY: INTERMITTENT

## 2020-10-16 ASSESSMENT — PAIN DESCRIPTION - LOCATION: LOCATION: BACK

## 2020-10-16 ASSESSMENT — PAIN SCALES - GENERAL: PAINLEVEL_OUTOF10: 10

## 2020-10-16 ASSESSMENT — PAIN DESCRIPTION - PAIN TYPE: TYPE: ACUTE PAIN

## 2020-10-16 ASSESSMENT — PAIN DESCRIPTION - DESCRIPTORS: DESCRIPTORS: SHARP

## 2020-10-16 ASSESSMENT — PAIN - FUNCTIONAL ASSESSMENT: PAIN_FUNCTIONAL_ASSESSMENT: PREVENTS OR INTERFERES SOME ACTIVE ACTIVITIES AND ADLS

## 2020-10-16 ASSESSMENT — PAIN DESCRIPTION - ORIENTATION: ORIENTATION: RIGHT

## 2020-10-16 NOTE — ED PROVIDER NOTES
2/2013); cyst removal (5/15/14); transesophageal echocardiogram; Skin cancer excision (10/2016); and pr exc parotd,total,dissect 5th nerv (Left, 11/8/2018). Social History:  reports that he quit smoking about 6 years ago. His smoking use included cigarettes. He has a 45.00 pack-year smoking history. He has never used smokeless tobacco. He reports that he does not drink alcohol or use drugs. Family History: family history includes Cancer in his mother; Heart Disease in his father. Allergies: Asa [aspirin]     Physical Exam           ED Triage Vitals [10/16/20 1736]   BP Temp Temp src Pulse Resp SpO2 Height Weight   (!) 142/73 97.5 °F (36.4 °C) -- 57 16 96 % 5' 11\" (1.803 m) 175 lb (79.4 kg)      Oxygen Saturation Interpretation: Normal.    Constitutional:  Alert, development consistent with age. HEENT:  NC/NT. Airway patent. Neck:  Normal ROM. Supple. Respiratory:  Clear to auscultation and breath sounds equal.  CV:  Regular rate and rhythm, normal heart sounds, without pathological murmurs, ectopy, gallops, or rubs. Chest:  Right posterior thorax tender to palpation, which does reproduce pain. Crepitance: No.          Skin:  Mild ecchymosis and trace edema with no deformity. GI:  Abdomen Soft, nontender, good bowel sounds. No firm or pulsatile mass. Integument:  Normal turgor. Warm, dry, without visible rash, unless noted elsewhere. Extremities: No tenderness or edema noted. Neurological:  Oriented. Motor functions intact. Lab / Imaging Results   (All laboratory and radiology results have been personally reviewed by myself)  Labs:  No results found for this visit on 10/16/20. Imaging: All Radiology results interpreted by Radiologist unless otherwise noted. CT CHEST WO CONTRAST   Preliminary Result   Acute right 8th and 9th rib fractures. Grade 1 chest wall injury. Innumerable spiculated pulmonary nodules.   Differential includes both benign   and malignant entities, including instructed on incentive spirometry use at home. Patient and daughter state they will call the pulmonologist first thing Monday morning. They understand that if he has any breathing problems or other complaints he is to return to the emergency department immediately. Counseling: The emergency provider has spoken with the patient and daughter and discussed todays results, in addition to providing specific details for the plan of care and counseling regarding the diagnosis and prognosis. Questions are answered at this time and they are agreeable with the plan. Assessment     1. Closed fracture of multiple ribs of right side, initial encounter    2. Fall, initial encounter    3. Pulmonary nodules      Plan   Discharge to home  Patient condition is stable    New Medications     Discharge Medication List as of 10/16/2020 10:55 PM        Electronically signed by SO Mccoy CNP   DD: 10/16/20  **This report was transcribed using voice recognition software. Every effort was made to ensure accuracy; however, inadvertent computerized transcription errors may be present.   END OF ED PROVIDER NOTE       SO Flanagan CNP  10/16/20 [id number]

## 2020-10-17 NOTE — ED NOTES
Pt wishes to speak with provider prior to drawing blood work; Provider speaking with Pt at this time     Claudia Salcedo RN  10/16/20 [id number]

## 2021-01-14 RX ORDER — SACUBITRIL AND VALSARTAN 97; 103 MG/1; MG/1
TABLET, FILM COATED ORAL
Qty: 60 TABLET | Refills: 5 | Status: SHIPPED
Start: 2021-01-14 | End: 2021-08-02

## 2021-01-22 ENCOUNTER — OFFICE VISIT (OUTPATIENT)
Dept: CARDIOLOGY CLINIC | Age: 76
End: 2021-01-22
Payer: MEDICARE

## 2021-01-22 VITALS
BODY MASS INDEX: 25.83 KG/M2 | RESPIRATION RATE: 14 BRPM | SYSTOLIC BLOOD PRESSURE: 132 MMHG | HEIGHT: 71 IN | DIASTOLIC BLOOD PRESSURE: 88 MMHG | WEIGHT: 184.5 LBS | HEART RATE: 76 BPM

## 2021-01-22 DIAGNOSIS — I48.20 CHRONIC ATRIAL FIBRILLATION (HCC): Primary | Chronic | ICD-10-CM

## 2021-01-22 DIAGNOSIS — I42.7 CARDIOMYOPATHY SECONDARY TO DRUG (HCC): ICD-10-CM

## 2021-01-22 PROCEDURE — G8417 CALC BMI ABV UP PARAM F/U: HCPCS | Performed by: INTERNAL MEDICINE

## 2021-01-22 PROCEDURE — 4040F PNEUMOC VAC/ADMIN/RCVD: CPT | Performed by: INTERNAL MEDICINE

## 2021-01-22 PROCEDURE — G8427 DOCREV CUR MEDS BY ELIG CLIN: HCPCS | Performed by: INTERNAL MEDICINE

## 2021-01-22 PROCEDURE — 99214 OFFICE O/P EST MOD 30 MIN: CPT | Performed by: INTERNAL MEDICINE

## 2021-01-22 PROCEDURE — G8484 FLU IMMUNIZE NO ADMIN: HCPCS | Performed by: INTERNAL MEDICINE

## 2021-01-22 PROCEDURE — 1123F ACP DISCUSS/DSCN MKR DOCD: CPT | Performed by: INTERNAL MEDICINE

## 2021-01-22 PROCEDURE — 93000 ELECTROCARDIOGRAM COMPLETE: CPT | Performed by: INTERNAL MEDICINE

## 2021-01-22 PROCEDURE — 3017F COLORECTAL CA SCREEN DOC REV: CPT | Performed by: INTERNAL MEDICINE

## 2021-01-22 PROCEDURE — 1036F TOBACCO NON-USER: CPT | Performed by: INTERNAL MEDICINE

## 2021-01-22 RX ORDER — TORSEMIDE 20 MG/1
TABLET ORAL
Qty: 90 TABLET | Refills: 3 | Status: SHIPPED
Start: 2021-01-22 | End: 2022-07-23

## 2021-01-22 RX ORDER — CARVEDILOL 25 MG/1
25 TABLET ORAL 2 TIMES DAILY WITH MEALS
Qty: 180 TABLET | Refills: 3 | Status: SHIPPED
Start: 2021-01-22 | End: 2022-02-07

## 2021-01-22 NOTE — PROGRESS NOTES
CHIEF COMPLAINT: CMP/Afib    HISTORY OF PRESENT ILLNESS: Patient is a 77 y.o. male seen at the request of Fantasma Raymundo MD.      No CP or SOB. Medical history includes:   1. Hodgkins disease documented around 1993 treated with chemotherapy only. Radiation not utilized. Currently in remission. 2. Dilated cardiomyopathy presenting with CHF, Spring 2000. Cardiac catheterization, 08/2000. Normal coronary arteries with severely impaired LV systolic function. EF 15-20%. He was treated with ACE inhibitors, beta blockers, digoxin and diuretics with improvement.    3. Echo, 04/07/2005. Mild MAC with mild MR. Mild TR with RVSP 30-35 mmHg. LV systolic and diastolic dilation - mild with mild diffuse hypokinesis. EF 40-45%. Stage I diastolic dysfunction.    4. Pulmonary fibrosis.    5. Paresthesias due to chemotherapy.    6. Cigarette abuse, 45 pack years. Quit 06/2014.    7. Exercise MPS, 07/02/2007. 12.5 METS, 75% MPHR. No chest pain or diagnostic ECG changes. Moderate LVE. EF 51%. Diaphragmatic attenuation, but no evidence for ischemia. 8. Echo, 07/16/2007. Aortic sclerosis without stenosis or insufficiency. Normal LV size, wall motion and systolic function. Stage I diastolic dysfunction. Elevated LA pressures. Marked LAE. Moderate MALISSA. Mild TR. Doppler evidence for severe PHTN (RVSP 60-65 mmHg). 9. Echo, 06/22/2009. Normal LV size with LV systolic function at the lower limits of normal. Stage II diastolic dysfunction. Elevated LA pressures. Mild RONALD. Mild TR, severe PHTN with RVSP about 65 mmHg. 10. Allergy to aspirin. 6. R [facility] 112 admission, 11/07/2014 with three to four episodes of slurred speech and left weakness consistent with TIA's. Presentation CBC, BMP, TnI x1 negative. PBNP 1606. CXR normal. CT head with no significant stenosis. MRI brain with multiple acute cerebral infarcts likely embolic in etiology. 12. Echo, 11/08/2014. LVEF 50-55%. Stage III diastolic dysfunction. Severe LAE. E/E' 16.  No valvular heart disease. Mild PHTN.    13. CROW, 11/10/2014. Normal EF. No valvular heart disease. Severe SEC in the LA and LUZ. Dilated LA. No obvious thrombus seen. Low escape velocity from the LUZ. 14. Event monitor, 11/15/2014. Strips available so far reveal predominantly sinus rhythm with IVCD. Multiple PAC's and PVC's. One episode of four beats of nonsustained VT.    15. Event monitor, 12/18/2014. Significant burden of AF. AF present >21 hours on 12/13/2014 through 12/16/2014. Average HR in the 80's.    16. Patient noted to be in AF/CVR during OV 01/15/2015. 17. Echo, 06/10/2016. Normal LV size with mild CLVH. Severe global hypokinesis of the LV with estimated EF between 30-35% by biplane method of disks. E/E' ratio 15. Patient is in AF. No significant valve abnormalities noted. 18. MUGA scan, 07/01/2016, mildly dilated left ventricle with global hypokinesis. Estimated ejection fraction 42%. No wall motion abnormality. 19. Entresto therapy initiated 12/22/2016.      Past Medical History:   Diagnosis Date    Anticoagulant long-term use     Atrial fibrillation (HCC)     CHF (congestive heart failure) ([address] 2000    Diabetes ([address]     Diastolic dysfunction     Dilated cardiomyopathy ([address]     Hodgkin's disease ([address] 1993    treated with chemo    Hx of TIA (transient ischemic attack) and stroke     Hyperlipidemia     Hypertension     Hypothyroidism     Pulmonary fibrosis ([address]        Patient Active Problem List   Diagnosis    Dilated cardiomyopathy ([address]    TIA (transient ischemic attack)    HTN (hypertension)    Tobacco abuse    Chronic atrial fibrillation (HCC)    Chronic systolic CHF (congestive heart failure) (HCC)    Acute on chronic systolic CHF (congestive heart failure) ([address]    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)       Allergies   Allergen Reactions    Asa [Aspirin] Hives       Current Outpatient Medications   Medication Sig Dispense Refill    ENTRESTO  MG per tablet TAKE ONE TABLET BY MOUTH TWO TIMES A DAY 60 tablet 5    carvedilol (COREG) 25 MG tablet Take 1 tablet by mouth twice a day with meals 180 tablet 2    torsemide (DEMADEX) 20 MG tablet Take 1 tablet by mouth every day 90 tablet 3    levothyroxine (SYNTHROID) 75 MCG tablet       SURE COMFORT PEN NEEDLES 31G X 8 MM MISC USE WITH INSULIN AS DIRECTED ONCE A DAY  3    ONETOUCH DELICA LANCETS 23K MISC USE TO TEST BLOOD GLUCOSE THREE TIMES DAILY  0    warfarin (COUMADIN) 5 MG tablet       ONETOUCH VERIO strip TESTING TID  11    insulin glargine (LANTUS) 100 UNIT/ML injection vial Inject 28 Units into the skin daily        No current facility-administered medications for this visit.         Social History     Socioeconomic History    Marital status:      Spouse name: Not on file    Number of children: Not on file    Years of education: Not on file    Highest education level: Not on file   Occupational History    Not on file   Social Needs    Financial resource strain: Not on file    Food insecurity     Worry: Not on file     Inability: Not on file    Transportation needs     Medical: Not on file     Non-medical: Not on file   Tobacco Use    Smoking status: Former Smoker     Packs/day: 1.50     Years: 30.00     Pack years: 45.00     Types: Cigarettes     Quit date: 2014     Years since quittin.6    Smokeless tobacco: Never Used   Substance and Sexual Activity    Alcohol use: No     Alcohol/week: 0.0 standard drinks    Drug use: No    Sexual activity: Not on file   Lifestyle    Physical activity     Days per week: Not on file     Minutes per session: Not on file    Stress: Not on file   Relationships    Social connections     Talks on phone: Not on file     Gets together: Not on file     Attends Hindu service: Not on file     Active member of club or organization: Not on file     Attends meetings of clubs or organizations: Not on file     Relationship status: Not on file    Intimate rash noted. Not diaphoretic. No erythema. Psychiatric: Normal mood and affect. Behavior is normal.     EKG:  nonspecific ST and T waves changes, atrial fibrillation. ASSESSMENT AND PLAN:  Patient Active Problem List   Diagnosis    [facility]    TIA (transient ischemic attack)    HTN (hypertension)    Tobacco abuse    Chronic atrial fibrillation (HCC)    Chronic systolic CHF (congestive heart failure) (HCC)    Acute on chronic systolic CHF (congestive heart failure) ([facility]    Cardiomyopathy secondary to drug ([facility]     1. CMP/CHF: Volume good. Continue coreg/entresto/demadex. 2. Afib: BB/warfarin. 3. HTN: Observe. 4. Pulm Fibrosis. Medardo Serrano D.O.   Cardiologist  Cardiology, [facility]

## 2021-08-02 RX ORDER — SACUBITRIL AND VALSARTAN 97; 103 MG/1; MG/1
TABLET, FILM COATED ORAL
Qty: 60 TABLET | Refills: 11 | Status: SHIPPED
Start: 2021-08-02 | End: 2022-08-18

## 2022-02-06 DIAGNOSIS — I42.7 CARDIOMYOPATHY SECONDARY TO DRUG (HCC): ICD-10-CM

## 2022-02-07 RX ORDER — CARVEDILOL 25 MG/1
TABLET ORAL
Qty: 180 TABLET | Refills: 0 | Status: SHIPPED
Start: 2022-02-07 | End: 2022-05-09

## 2022-02-18 ENCOUNTER — HOSPITAL ENCOUNTER (EMERGENCY)
Age: 77
Discharge: HOME | End: 2022-02-19
Attending: EMERGENCY MEDICINE
Payer: MEDICARE

## 2022-02-18 VITALS
HEART RATE: 69 BPM | SYSTOLIC BLOOD PRESSURE: 158 MMHG | TEMPERATURE: 97.7 F | WEIGHT: 180 LBS | OXYGEN SATURATION: 93 % | RESPIRATION RATE: 16 BRPM | HEIGHT: 71 IN | DIASTOLIC BLOOD PRESSURE: 77 MMHG | BODY MASS INDEX: 25.2 KG/M2

## 2022-02-18 DIAGNOSIS — R04.0 EPISTAXIS: Primary | ICD-10-CM

## 2022-02-18 PROCEDURE — 85025 COMPLETE CBC W/AUTO DIFF WBC: CPT

## 2022-02-18 PROCEDURE — 85730 THROMBOPLASTIN TIME PARTIAL: CPT

## 2022-02-18 PROCEDURE — 99284 EMERGENCY DEPT VISIT MOD MDM: CPT

## 2022-02-18 PROCEDURE — 85610 PROTHROMBIN TIME: CPT

## 2022-02-19 LAB
APTT: 37.6 SEC (ref 24.5–35.1)
BASOPHILS ABSOLUTE: 0.07 E9/L (ref 0–0.2)
BASOPHILS RELATIVE PERCENT: 0.8 % (ref 0–2)
EOSINOPHILS ABSOLUTE: 0.14 E9/L (ref 0.05–0.5)
EOSINOPHILS RELATIVE PERCENT: 1.6 % (ref 0–6)
HCT VFR BLD CALC: 44.9 % (ref 37–54)
HEMOGLOBIN: 14.6 G/DL (ref 12.5–16.5)
IMMATURE GRANULOCYTES #: 0.03 E9/L
IMMATURE GRANULOCYTES %: 0.4 % (ref 0–5)
INR BLD: 2.5
LYMPHOCYTES ABSOLUTE: 2.56 E9/L (ref 1.5–4)
LYMPHOCYTES RELATIVE PERCENT: 30 % (ref 20–42)
MCH RBC QN AUTO: 32.4 PG (ref 26–35)
MCHC RBC AUTO-ENTMCNC: 32.5 % (ref 32–34.5)
MCV RBC AUTO: 99.6 FL (ref 80–99.9)
MONOCYTES ABSOLUTE: 1.07 E9/L (ref 0.1–0.95)
MONOCYTES RELATIVE PERCENT: 12.6 % (ref 2–12)
NEUTROPHILS ABSOLUTE: 4.65 E9/L (ref 1.8–7.3)
NEUTROPHILS RELATIVE PERCENT: 54.6 % (ref 43–80)
PDW BLD-RTO: 14.3 FL (ref 11.5–15)
PLATELET # BLD: 212 E9/L (ref 130–450)
PMV BLD AUTO: 10.5 FL (ref 7–12)
PROTHROMBIN TIME: 27.4 SEC (ref 9.3–12.4)
RBC # BLD: 4.51 E12/L (ref 3.8–5.8)
WBC # BLD: 8.5 E9/L (ref 4.5–11.5)

## 2022-02-19 RX ORDER — SODIUM CHLORIDE/ALOE VERA
GEL (GRAM) NASAL 4 TIMES DAILY
Qty: 100 ML | Refills: 3 | Status: SHIPPED | OUTPATIENT
Start: 2022-02-19 | End: 2022-03-14

## 2022-02-19 NOTE — ED PROVIDER NOTES
HPI:  2/19/22,   Time: 12:13 AM KEITH Waterman is a 77 y.o. male presenting to the ED for epistaxis, beginning 1 day ago. The complaint has been intermittent, mild in severity, and worsened by nothing. Has stopped, 2 episodes last 20 min. No recent trauma. No fever/chills/sweats/cp/sob/abd pain/n/v/d    Review of Systems:   Pertinent positives and negatives are stated within HPI, all other systems reviewed and are negative.          --------------------------------------------- PAST HISTORY ---------------------------------------------  Past Medical History:  has a past medical history of Anticoagulant long-term use, Atrial fibrillation ([address] CHF (congestive heart failure) ([address] Diabetes ([address] Diastolic dysfunction, Dilated cardiomyopathy ([address] Hodgkin's disease ([address] Hx of TIA (transient ischemic attack) and stroke, Hyperlipidemia, Hypertension, Hypothyroidism, and Pulmonary fibrosis ([address] Past Surgical History:  has a past surgical history that includes Diagnostic Cardiac Cath Lab Procedure (8/2000); Tonsillectomy; eye surgery (Bilateral, 1/2013 2/2013); cyst removal (5/15/14); transesophageal echocardiogram; Skin cancer excision (10/2016); and pr exc parotd,total,dissect 5th nerv (Left, 11/8/2018). Social History:  reports that he quit smoking about 7 years ago. His smoking use included cigarettes. He has a 45.00 pack-year smoking history. He has never used smokeless tobacco. He reports that he does not drink alcohol and does not use drugs. Family History: family history includes Cancer in his mother; Heart Disease in his father. The patients home medications have been reviewed.     Allergies: Asa [aspirin]        ---------------------------------------------------PHYSICAL EXAM--------------------------------------    Constitutional/General: Alert and oriented x3, well appearing, non toxic in NAD  Head: Normocephalic and atraumatic, dried blood bill nares  Eyes: PERRL, EOMI, conjunctive normal, sclera non icteric  Mouth: Oropharynx clear, handling secretions, no trismus, no asymmetry of the posterior oropharynx or uvular edema  Neck: Supple, full ROM,  Respiratory: . Not in respiratory distress  Cardiovascular:  . 2+ distal pulses    Musculoskeletal: Moves all extremities x 4. Warm and well perfused,     Neurologic: GCS 15, no focal deficits,   Psychiatric: Normal Affect    -------------------------------------------------- RESULTS -------------------------------------------------  I have personally reviewed all laboratory and imaging results for this patient. Results are listed below. LABS:  Results for orders placed or performed during the hospital encounter of 02/18/22   Protime-INR   Result Value Ref Range    Protime 27.4 (H) 9.3 - 12.4 sec    INR 2.5    APTT   Result Value Ref Range    aPTT 37.6 (H) 24.5 - 35.1 sec   CBC with Auto Differential   Result Value Ref Range    WBC 8.5 4.5 - 11.5 E9/L    RBC 4.51 3.80 - 5.80 E12/L    Hemoglobin 14.6 12.5 - 16.5 g/dL    Hematocrit 44.9 37.0 - 54.0 %    MCV 99.6 80.0 - 99.9 fL    MCH 32.4 26.0 - 35.0 pg    MCHC 32.5 32.0 - 34.5 %    RDW 14.3 11.5 - 15.0 fL    Platelets 230 850 - 220 E9/L    MPV 10.5 7.0 - 12.0 fL    Neutrophils % 54.6 43.0 - 80.0 %    Immature Granulocytes % 0.4 0.0 - 5.0 %    Lymphocytes % 30.0 20.0 - 42.0 %    Monocytes % 12.6 (H) 2.0 - 12.0 %    Eosinophils % 1.6 0.0 - 6.0 %    Basophils % 0.8 0.0 - 2.0 %    Neutrophils Absolute 4.65 1.80 - 7.30 E9/L    Immature Granulocytes # 0.03 E9/L    Lymphocytes Absolute 2.56 1.50 - 4.00 E9/L    Monocytes Absolute 1.07 (H) 0.10 - 0.95 E9/L    Eosinophils Absolute 0.14 0.05 - 0.50 E9/L    Basophils Absolute 0.07 0.00 - 0.20 E9/L       RADIOLOGY:  Interpreted by Radiologist.  No orders to display       EKG: This EKG is signed and interpreted by the EP.     Time:   Rate:   Rhythm:   Interpretation:   Comparison:       ------------------------- NURSING NOTES AND VITALS REVIEWED ---------------------------   The nursing notes within the ED encounter and vital signs as below have been reviewed by myself. BP (!) 158/77   Pulse 69   Temp 97.7 °F (36.5 °C)   Resp 16   Ht 5' 11\" (1.803 m)   Wt 180 lb (81.6 kg)   SpO2 93%   BMI 25.10 kg/m²   Oxygen Saturation Interpretation: Normal    The patients available past medical records and past encounters were reviewed. ------------------------------ ED COURSE/MEDICAL DECISION MAKING----------------------  Medications - No data to display      ED COURSE:       Medical Decision Making:    inr in range, no bleeding, dc on nasal saline and outpt fu      This patient's ED course included: a personal history and physicial examination    This patient has remained hemodynamically stable during their ED course. Re-Evaluations:             Re-evaluation. Patients symptoms show no change        Counseling: The emergency provider has spoken with the patient and discussed todays results, in addition to providing specific details for the plan of care and counseling regarding the diagnosis and prognosis. Questions are answered at this time and they are agreeable with the plan.       --------------------------------- IMPRESSION AND DISPOSITION ---------------------------------    IMPRESSION  1. Epistaxis        DISPOSITION  Disposition: Discharge to home  Patient condition is stable    NOTE: This report was transcribed using voice recognition software.  Every effort was made to ensure accuracy; however, inadvertent computerized transcription errors may be present        Jillian Higginbotham MD  02/19/22 7947 2. The status of comorbities. (See ED/admit documents)

## 2022-02-19 NOTE — ED NOTES
PT PREPARED FOR DC. PT VERBALIZED UNDERSTANDING OF DC INSTRUCTIONS. PT AMBULATORY AT TIME OF DC, NO SIGNS OF DISTRESS.  GAIT STEADY       Komal Kelly RN  02/19/22 0021

## 2022-03-14 ENCOUNTER — OFFICE VISIT (OUTPATIENT)
Dept: CARDIOLOGY CLINIC | Age: 77
End: 2022-03-14
Payer: MEDICARE

## 2022-03-14 VITALS
DIASTOLIC BLOOD PRESSURE: 60 MMHG | HEIGHT: 71 IN | BODY MASS INDEX: 25.31 KG/M2 | RESPIRATION RATE: 18 BRPM | HEART RATE: 72 BPM | WEIGHT: 180.8 LBS | SYSTOLIC BLOOD PRESSURE: 122 MMHG

## 2022-03-14 DIAGNOSIS — I42.0 DILATED CARDIOMYOPATHY (HCC): ICD-10-CM

## 2022-03-14 DIAGNOSIS — I48.20 CHRONIC ATRIAL FIBRILLATION (HCC): Primary | ICD-10-CM

## 2022-03-14 PROCEDURE — 4040F PNEUMOC VAC/ADMIN/RCVD: CPT | Performed by: INTERNAL MEDICINE

## 2022-03-14 PROCEDURE — 99214 OFFICE O/P EST MOD 30 MIN: CPT | Performed by: INTERNAL MEDICINE

## 2022-03-14 PROCEDURE — 3017F COLORECTAL CA SCREEN DOC REV: CPT | Performed by: INTERNAL MEDICINE

## 2022-03-14 PROCEDURE — 93000 ELECTROCARDIOGRAM COMPLETE: CPT | Performed by: INTERNAL MEDICINE

## 2022-03-14 PROCEDURE — G8419 CALC BMI OUT NRM PARAM NOF/U: HCPCS | Performed by: INTERNAL MEDICINE

## 2022-03-14 PROCEDURE — G8427 DOCREV CUR MEDS BY ELIG CLIN: HCPCS | Performed by: INTERNAL MEDICINE

## 2022-03-14 PROCEDURE — 1036F TOBACCO NON-USER: CPT | Performed by: INTERNAL MEDICINE

## 2022-03-14 PROCEDURE — 1123F ACP DISCUSS/DSCN MKR DOCD: CPT | Performed by: INTERNAL MEDICINE

## 2022-03-14 PROCEDURE — G8484 FLU IMMUNIZE NO ADMIN: HCPCS | Performed by: INTERNAL MEDICINE

## 2022-03-14 NOTE — PROGRESS NOTES
CHIEF COMPLAINT: CMP/Afib    HISTORY OF PRESENT ILLNESS: Patient is a 77 y.o. male seen at the request of Favian Pate MD.      No CP or SOB. Medical history includes:   1. Hodgkins disease documented around 1993 treated with chemotherapy only. Radiation not utilized. Currently in remission. 2. Dilated cardiomyopathy presenting with CHF, Spring 2000. Cardiac catheterization, 08/2000. Normal coronary arteries with severely impaired LV systolic function. EF 15-20%. He was treated with ACE inhibitors, beta blockers, digoxin and diuretics with improvement.    3. Echo, 04/07/2005. Mild MAC with mild MR. Mild TR with RVSP 30-35 mmHg. LV systolic and diastolic dilation - mild with mild diffuse hypokinesis. EF 40-45%. Stage I diastolic dysfunction.    4. Pulmonary fibrosis.    5. Paresthesias due to chemotherapy.    6. Cigarette abuse, 45 pack years. Quit 06/2014.    7. Exercise MPS, 07/02/2007. 12.5 METS, 75% MPHR. No chest pain or diagnostic ECG changes. Moderate LVE. EF 51%. Diaphragmatic attenuation, but no evidence for ischemia. 8. Echo, 07/16/2007. Aortic sclerosis without stenosis or insufficiency. Normal LV size, wall motion and systolic function. Stage I diastolic dysfunction. Elevated LA pressures. Marked LAE. Moderate MALISSA. Mild TR. Doppler evidence for severe PHTN (RVSP 60-65 mmHg). 9. Echo, 06/22/2009. Normal LV size with LV systolic function at the lower limits of normal. Stage II diastolic dysfunction. Elevated LA pressures. Mild RONALD. Mild TR, severe PHTN with RVSP about 65 mmHg. 10. Allergy to aspirin. 6. R [facility] 112 admission, 11/07/2014 with three to four episodes of slurred speech and left weakness consistent with TIA's. Presentation CBC, BMP, TnI x1 negative. PBNP 1606. CXR normal. CT head with no significant stenosis. MRI brain with multiple acute cerebral infarcts likely embolic in etiology. 12. Echo, 11/08/2014. LVEF 50-55%. Stage III diastolic dysfunction. Severe LAE. E/E' 16.  No valvular heart disease. Mild PHTN.    13. CROW, 11/10/2014. Normal EF. No valvular heart disease. Severe SEC in the LA and LUZ. Dilated LA. No obvious thrombus seen. Low escape velocity from the LUZ. 14. Event monitor, 11/15/2014. Strips available so far reveal predominantly sinus rhythm with IVCD. Multiple PAC's and PVC's. One episode of four beats of nonsustained VT.    15. Event monitor, 12/18/2014. Significant burden of AF. AF present >21 hours on 12/13/2014 through 12/16/2014. Average HR in the 80's.    16. Patient noted to be in AF/CVR during OV 01/15/2015. 17. Echo, 06/10/2016. Normal LV size with mild CLVH. Severe global hypokinesis of the LV with estimated EF between 30-35% by biplane method of disks. E/E' ratio 15. Patient is in AF. No significant valve abnormalities noted. 18. MUGA scan, 07/01/2016, mildly dilated left ventricle with global hypokinesis. Estimated ejection fraction 42%. No wall motion abnormality. 19. Entresto therapy initiated 12/22/2016.      Past Medical History:   Diagnosis Date    Anticoagulant long-term use     Atrial fibrillation (HCC)     CHF (congestive heart failure) ([address] 2000    Diabetes ([address]     Diastolic dysfunction     Dilated cardiomyopathy ([address]     Hodgkin's disease ([address] 1993    treated with chemo    Hx of TIA (transient ischemic attack) and stroke     Hyperlipidemia     Hypertension     Hypothyroidism     Pulmonary fibrosis ([address]        Patient Active Problem List   Diagnosis    Dilated cardiomyopathy ([address]    TIA (transient ischemic attack)    HTN (hypertension)    Tobacco abuse    Chronic atrial fibrillation (HCC)    Chronic systolic CHF (congestive heart failure) (HCC)    Acute on chronic systolic CHF (congestive heart failure) ([address]    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)       Allergies   Allergen Reactions    Asa [Aspirin] Hives       Current Outpatient Medications   Medication Sig Dispense Refill    carvedilol (COREG) 25 MG tablet TAKE 1 TABLET BY MOUTH TWICE A DAY WITH MEALS 180 tablet 0    ENTRESTO  MG per tablet TAKE ONE TABLET BY MOUTH TWO TIMES A DAY 60 tablet 11    torsemide (DEMADEX) 20 MG tablet Take 1 tablet by mouth every day (Patient taking differently: as needed Take 1 tablet by mouth every day) 90 tablet 3    levothyroxine (SYNTHROID) 75 MCG tablet 75 mcg Daily       SURE COMFORT PEN NEEDLES 31G X 8 MM MISC USE WITH INSULIN AS DIRECTED ONCE A DAY  3    ONETOUCH DELICA LANCETS 92O MISC USE TO TEST BLOOD GLUCOSE THREE TIMES DAILY  0    warfarin (COUMADIN) 5 MG tablet       ONETOUCH VERIO strip TESTING TID  11    insulin glargine (LANTUS) 100 UNIT/ML injection vial Inject 24 Units into the skin daily        No current facility-administered medications for this visit. Social History     Socioeconomic History    Marital status:      Spouse name: Not on file    Number of children: Not on file    Years of education: Not on file    Highest education level: Not on file   Occupational History    Not on file   Tobacco Use    Smoking status: Former Smoker     Packs/day: 1.50     Years: 30.00     Pack years: 45.00     Types: Cigarettes     Quit date: 2014     Years since quittin.7    Smokeless tobacco: Never Used   Vaping Use    Vaping Use: Never used   Substance and Sexual Activity    Alcohol use: No     Alcohol/week: 0.0 standard drinks    Drug use: No    Sexual activity: Not on file   Other Topics Concern    Not on file   Social History Narrative    Not on file     Social Determinants of Health     Financial Resource Strain:     Difficulty of Paying Living Expenses: Not on file   Food Insecurity:     Worried About [address] in the Last Year: Not on file    [address] in the Last Year: Not on file   Transportation Needs:     Lack of Transportation (Medical): Not on file    Lack of Transportation (Non-Medical):  Not on file   Physical Activity:     Days of Exercise per Week: Not on file    Minutes of Exercise per Session: Not on file   Stress:     Feeling of Stress : Not on file   Social Connections:     Frequency of Communication with Friends and Family: Not on file    Frequency of Social Gatherings with Friends and Family: Not on file    Attends Confucianism Services: Not on file    Active Member of [address] [place] [address] [employer] or Organizations: Not on file    Attends Club or Organization Meetings: Not on file    Marital Status: Not on file   Intimate Partner Violence:     Fear of Current or Ex-Partner: Not on file    Emotionally Abused: Not on file    Physically Abused: Not on file    Sexually Abused: Not on file   Housing Stability:     Unable to Pay for Housing in the Last Year: Not on file    Number of [place] in the Last Year: Not on file    Unstable Housing in the Last Year: Not on file       Family History   Problem Relation Age of Onset    Cancer Mother     Heart Disease Father        Review of Systems:  Heart: as above   Lungs: as above   Eyes: denies changes in vision or discharge. Ears: denies changes in hearing or pain. Nose: denies epistaxis or masses   Throat: denies sore throat or trouble swallowing. Neuro: denies numbness, tingling, tremors. Skin: denies rashes or itching. : denies hematuria, dysuria   GI: denies vomiting, diarrhea   Psych: denies mood changed, anxiety, depression. All other systems negative. Physical Exam   /60   Pulse 72   Resp 18   Ht 5' 11\" (1.803 m)   Wt 180 lb 12.8 oz (82 kg)   BMI 25.22 kg/m²   Constitutional: Oriented to person, place, and time. Well-developed and well-nourished. No distress. Head: Normocephalic and atraumatic. Eyes: EOM are normal. Pupils are equal, round, and reactive to light. Neck: Normal range of motion. Neck supple. No hepatojugular reflux and no JVD present. Carotid bruit is not present. No tracheal deviation present. No thyromegaly present.    Cardiovascular: Normal rate, irregular rhythm, normal heart sounds and intact distal pulses. Exam reveals no gallop and no friction rub. No murmur heard. Pulmonary/Chest: Effort normal and breath sounds normal. No respiratory distress. No wheezes. No rales. No tenderness. Abdominal: Soft. Bowel sounds are normal. No distension and no mass. No tenderness. No rebound and no guarding. Musculoskeletal: Normal range of motion. No edema and no tenderness. Lymphadenopathy:   No cervical adenopathy. No groin adenopathy. Neurological: Alert and oriented to person, place, and time. Skin: Skin is warm and dry. No rash noted. Not diaphoretic. No erythema. Psychiatric: Normal mood and affect. Behavior is normal.     EKG:  nonspecific ST and T waves changes, atrial fibrillation. ASSESSMENT AND PLAN:  Patient Active Problem List   Diagnosis    [facility]    TIA (transient ischemic attack)    HTN (hypertension)    Tobacco abuse    Chronic atrial fibrillation (HCC)    Chronic systolic CHF (congestive heart failure) (HCC)    Acute on chronic systolic CHF (congestive heart failure) ([facility]    Cardiomyopathy secondary to drug ([facility]     1. CMP/CHF: Volume good. Continue coreg/entresto/demadex. 2. Afib: BB/warfarin. 3. HTN: Observe. 4. Pulm Fibrosis. 5. DM: Per PCP. Shameka Bansal D.O.   Cardiologist  Cardiology, [facility]

## 2022-05-07 DIAGNOSIS — I42.7 CARDIOMYOPATHY SECONDARY TO DRUG (HCC): ICD-10-CM

## 2022-05-09 RX ORDER — CARVEDILOL 25 MG/1
TABLET ORAL
Qty: 180 TABLET | Refills: 3 | Status: SHIPPED | OUTPATIENT
Start: 2022-05-09

## 2022-07-23 ENCOUNTER — APPOINTMENT (OUTPATIENT)
Dept: CT IMAGING | Age: 77
End: 2022-07-23
Payer: MEDICARE

## 2022-07-23 ENCOUNTER — HOSPITAL ENCOUNTER (EMERGENCY)
Age: 77
Discharge: HOME | End: 2022-07-23
Attending: EMERGENCY MEDICINE
Payer: MEDICARE

## 2022-07-23 ENCOUNTER — APPOINTMENT (OUTPATIENT)
Dept: GENERAL RADIOLOGY | Age: 77
End: 2022-07-23
Payer: MEDICARE

## 2022-07-23 VITALS
WEIGHT: 170 LBS | HEART RATE: 98 BPM | RESPIRATION RATE: 16 BRPM | SYSTOLIC BLOOD PRESSURE: 110 MMHG | TEMPERATURE: 97.6 F | BODY MASS INDEX: 23.8 KG/M2 | HEIGHT: 71 IN | OXYGEN SATURATION: 94 % | DIASTOLIC BLOOD PRESSURE: 58 MMHG

## 2022-07-23 DIAGNOSIS — E16.2 HYPOGLYCEMIA: Primary | ICD-10-CM

## 2022-07-23 LAB
ALBUMIN SERPL-MCNC: 3.8 G/DL (ref 3.5–5.2)
ALP BLD-CCNC: 91 U/L (ref 40–129)
ALT SERPL-CCNC: 9 U/L (ref 0–40)
ANION GAP SERPL CALCULATED.3IONS-SCNC: 7 MMOL/L (ref 7–16)
APTT: 35.5 SEC (ref 24.5–35.1)
AST SERPL-CCNC: 16 U/L (ref 0–39)
BASOPHILS ABSOLUTE: 0.07 E9/L (ref 0–0.2)
BASOPHILS RELATIVE PERCENT: 0.5 % (ref 0–2)
BILIRUB SERPL-MCNC: 1.1 MG/DL (ref 0–1.2)
BUN BLDV-MCNC: 14 MG/DL (ref 6–23)
CALCIUM SERPL-MCNC: 8.5 MG/DL (ref 8.6–10.2)
CHLORIDE BLD-SCNC: 104 MMOL/L (ref 98–107)
CHP ED QC CHECK: YES
CO2: 31 MMOL/L (ref 22–29)
CREAT SERPL-MCNC: 1.1 MG/DL (ref 0.7–1.2)
EOSINOPHILS ABSOLUTE: 0.17 E9/L (ref 0.05–0.5)
EOSINOPHILS RELATIVE PERCENT: 1.3 % (ref 0–6)
GFR AFRICAN AMERICAN: >60
GFR NON-AFRICAN AMERICAN: >60 ML/MIN/1.73
GLUCOSE BLD-MCNC: 217 MG/DL
GLUCOSE BLD-MCNC: 56 MG/DL (ref 74–99)
HCT VFR BLD CALC: 46.7 % (ref 37–54)
HEMOGLOBIN: 15.4 G/DL (ref 12.5–16.5)
IMMATURE GRANULOCYTES #: 0.07 E9/L
IMMATURE GRANULOCYTES %: 0.5 % (ref 0–5)
INR BLD: 2.5
LYMPHOCYTES ABSOLUTE: 1.78 E9/L (ref 1.5–4)
LYMPHOCYTES RELATIVE PERCENT: 13.4 % (ref 20–42)
MCH RBC QN AUTO: 31.8 PG (ref 26–35)
MCHC RBC AUTO-ENTMCNC: 33 % (ref 32–34.5)
MCV RBC AUTO: 96.5 FL (ref 80–99.9)
METER GLUCOSE: 217 MG/DL (ref 74–99)
METER GLUCOSE: 82 MG/DL (ref 74–99)
MONOCYTES ABSOLUTE: 1.2 E9/L (ref 0.1–0.95)
MONOCYTES RELATIVE PERCENT: 9 % (ref 2–12)
NEUTROPHILS ABSOLUTE: 9.99 E9/L (ref 1.8–7.3)
NEUTROPHILS RELATIVE PERCENT: 75.3 % (ref 43–80)
PDW BLD-RTO: 14.9 FL (ref 11.5–15)
PLATELET # BLD: 205 E9/L (ref 130–450)
PMV BLD AUTO: 10.6 FL (ref 7–12)
POTASSIUM REFLEX MAGNESIUM: 3.9 MMOL/L (ref 3.5–5)
PRO-BNP: 2241 PG/ML (ref 0–450)
PROTHROMBIN TIME: 27 SEC (ref 9.3–12.4)
RBC # BLD: 4.84 E12/L (ref 3.8–5.8)
SODIUM BLD-SCNC: 142 MMOL/L (ref 132–146)
TOTAL PROTEIN: 6.8 G/DL (ref 6.4–8.3)
TROPONIN, HIGH SENSITIVITY: 19 NG/L (ref 0–11)
WBC # BLD: 13.3 E9/L (ref 4.5–11.5)

## 2022-07-23 PROCEDURE — 71045 X-RAY EXAM CHEST 1 VIEW: CPT

## 2022-07-23 PROCEDURE — 2500000003 HC RX 250 WO HCPCS: Performed by: EMERGENCY MEDICINE

## 2022-07-23 PROCEDURE — 85730 THROMBOPLASTIN TIME PARTIAL: CPT

## 2022-07-23 PROCEDURE — 85025 COMPLETE CBC W/AUTO DIFF WBC: CPT

## 2022-07-23 PROCEDURE — 83880 ASSAY OF NATRIURETIC PEPTIDE: CPT

## 2022-07-23 PROCEDURE — 36415 COLL VENOUS BLD VENIPUNCTURE: CPT

## 2022-07-23 PROCEDURE — 2580000003 HC RX 258: Performed by: EMERGENCY MEDICINE

## 2022-07-23 PROCEDURE — 99285 EMERGENCY DEPT VISIT HI MDM: CPT

## 2022-07-23 PROCEDURE — 85610 PROTHROMBIN TIME: CPT

## 2022-07-23 PROCEDURE — 70450 CT HEAD/BRAIN W/O DYE: CPT

## 2022-07-23 PROCEDURE — 93005 ELECTROCARDIOGRAM TRACING: CPT | Performed by: EMERGENCY MEDICINE

## 2022-07-23 PROCEDURE — 80053 COMPREHEN METABOLIC PANEL: CPT

## 2022-07-23 PROCEDURE — 82962 GLUCOSE BLOOD TEST: CPT

## 2022-07-23 PROCEDURE — 84484 ASSAY OF TROPONIN QUANT: CPT

## 2022-07-23 RX ORDER — DEXTROSE MONOHYDRATE 25 G/50ML
25 INJECTION, SOLUTION INTRAVENOUS ONCE
Status: COMPLETED | OUTPATIENT
Start: 2022-07-23 | End: 2022-07-23

## 2022-07-23 RX ORDER — 0.9 % SODIUM CHLORIDE 0.9 %
500 INTRAVENOUS SOLUTION INTRAVENOUS ONCE
Status: COMPLETED | OUTPATIENT
Start: 2022-07-23 | End: 2022-07-23

## 2022-07-23 RX ADMIN — DEXTROSE MONOHYDRATE 25 G: 25 INJECTION, SOLUTION INTRAVENOUS at 18:33

## 2022-07-23 RX ADMIN — SODIUM CHLORIDE 500 ML: 9 INJECTION, SOLUTION INTRAVENOUS at 15:50

## 2022-07-23 ASSESSMENT — PAIN - FUNCTIONAL ASSESSMENT: PAIN_FUNCTIONAL_ASSESSMENT: NONE - DENIES PAIN

## 2022-07-23 NOTE — ED PROVIDER NOTES
HPI:  7/23/22,   Time: 2:24 PM EDT       Christoph Azevedo is a 77 y.o. male presenting to the ED for near syncope/fall, beginning 1 hr ago. The complaint has been intermittent, moderate in severity, and worsened by nothing. Bib ems, ems reports concern for cva. Pt states felt light headed, near syncope. Thought his sugar was low. Ems didn't verbally report hypoglycemia, but run sheet shows glucose of 48. Given glucose iv, improved. Pt states sx resolved. No cp/n/v/d/abd pain. Pt states didn't fall or hit head. No numbness/tingling/focal weakness    Review of Systems:   Pertinent positives and negatives are stated within HPI, all other systems reviewed and are negative.          --------------------------------------------- PAST HISTORY ---------------------------------------------  Past Medical History:  has a past medical history of Anticoagulant long-term use, Atrial fibrillation ([address] CHF (congestive heart failure) ([address] Diabetes ([address] Diastolic dysfunction, Dilated cardiomyopathy ([address] Hodgkin's disease ([address] Hx of TIA (transient ischemic attack) and stroke, Hyperlipidemia, Hypertension, Hypothyroidism, and Pulmonary fibrosis ([address] Past Surgical History:  has a past surgical history that includes Diagnostic Cardiac Cath Lab Procedure (8/2000); Tonsillectomy; eye surgery (Bilateral, 1/2013 2/2013); cyst removal (5/15/14); transesophageal echocardiogram; Skin cancer excision (10/2016); and pr exc parotd,total,dissect 5th nerv (Left, 11/8/2018). Social History:  reports that he quit smoking about 8 years ago. His smoking use included cigarettes. He has a 45.00 pack-year smoking history. He has never used smokeless tobacco. He reports that he does not drink alcohol and does not use drugs. Family History: family history includes Cancer in his mother; Heart Disease in his father. The patients home medications have been reviewed.     Allergies: Naomi Fermin [aspirin]        ---------------------------------------------------PHYSICAL EXAM--------------------------------------    Constitutional/General: Alert and oriented x3, well appearing, non toxic in NAD  Head: Normocephalic and atraumatic  Eyes: PERRL, EOMI, conjunctive normal, sclera non icteric  Mouth: Oropharynx clear, handling secretions, no trismus, no asymmetry of the posterior oropharynx or uvular edema  Neck: Supple, full ROM,   Respiratory: Lungs clear to auscultation bilaterally, no wheezes, rales, or rhonchi. Not in respiratory distress  Cardiovascular:  Regular rate. Regular rhythm. No murmurs, gallops, or rubs. 2+ distal pulses  Chest: No chest wall tenderness  GI:  Abdomen Soft, Non tender, Non distended. .   Musculoskeletal: Moves all extremities x 4. Warm and well perfused,   Integument: skin warm and dry. No rashes. Lymphatic: no lymphadenopathy noted  Neurologic: GCS 15, no focal deficits,   Psychiatric: Normal Affect    -------------------------------------------------- RESULTS -------------------------------------------------  I have personally reviewed all laboratory and imaging results for this patient. Results are listed below.      LABS:  Results for orders placed or performed during the hospital encounter of 07/23/22   CBC with Auto Differential   Result Value Ref Range    WBC 13.3 (H) 4.5 - 11.5 E9/L    RBC 4.84 3.80 - 5.80 E12/L    Hemoglobin 15.4 12.5 - 16.5 g/dL    Hematocrit 46.7 37.0 - 54.0 %    MCV 96.5 80.0 - 99.9 fL    MCH 31.8 26.0 - 35.0 pg    MCHC 33.0 32.0 - 34.5 %    RDW 14.9 11.5 - 15.0 fL    Platelets 997 936 - 405 E9/L    MPV 10.6 7.0 - 12.0 fL    Neutrophils % 75.3 43.0 - 80.0 %    Immature Granulocytes % 0.5 0.0 - 5.0 %    Lymphocytes % 13.4 (L) 20.0 - 42.0 %    Monocytes % 9.0 2.0 - 12.0 %    Eosinophils % 1.3 0.0 - 6.0 %    Basophils % 0.5 0.0 - 2.0 %    Neutrophils Absolute 9.99 (H) 1.80 - 7.30 E9/L    Immature Granulocytes # 0.07 E9/L    Lymphocytes Absolute 1.78 1.50 - 4.00 E9/L    Monocytes Absolute 1.20 (H) 0.10 - 0.95 E9/L    Eosinophils Absolute 0.17 0.05 - 0.50 E9/L    Basophils Absolute 0.07 0.00 - 0.20 E9/L   Comprehensive Metabolic Panel w/ Reflex to MG   Result Value Ref Range    Sodium 142 132 - 146 mmol/L    Potassium reflex Magnesium 3.9 3.5 - 5.0 mmol/L    Chloride 104 98 - 107 mmol/L    CO2 31 (H) 22 - 29 mmol/L    Anion Gap 7 7 - 16 mmol/L    Glucose 56 (L) 74 - 99 mg/dL    BUN 14 6 - 23 mg/dL    Creatinine 1.1 0.7 - 1.2 mg/dL    GFR Non-African American >60 >=60 mL/min/1.73    GFR African American >60     Calcium 8.5 (L) 8.6 - 10.2 mg/dL    Total Protein 6.8 6.4 - 8.3 g/dL    Albumin 3.8 3.5 - 5.2 g/dL    Total Bilirubin 1.1 0.0 - 1.2 mg/dL    Alkaline Phosphatase 91 40 - 129 U/L    ALT 9 0 - 40 U/L    AST 16 0 - 39 U/L   Troponin   Result Value Ref Range    Troponin, High Sensitivity 19 (H) 0 - 11 ng/L   Protime-INR   Result Value Ref Range    Protime 27.0 (H) 9.3 - 12.4 sec    INR 2.5    APTT   Result Value Ref Range    aPTT 35.5 (H) 24.5 - 35.1 sec   Brain Natriuretic Peptide   Result Value Ref Range    Pro-BNP 2,241 (H) 0 - 450 pg/mL   POCT Glucose   Result Value Ref Range    Meter Glucose 82 74 - 99 mg/dL   POCT Glucose   Result Value Ref Range    Glucose 217 mg/dL    QC OK? yes    POCT Glucose   Result Value Ref Range    Meter Glucose 217 (H) 74 - 99 mg/dL       RADIOLOGY:  Interpreted by Radiologist.  CT Head WO Contrast   Final Result   No acute intracranial abnormality. XR CHEST PORTABLE   Final Result   Chronic changes in the chest.  No acute process detected. EKG:  This EKG is signed and interpreted by the EP. Time: 1434  Rate: 60  Rhythm: Atrial fibrillation  Interpretation: atrial fibrillation (chronic)  Comparison: None      ------------------------- NURSING NOTES AND VITALS REVIEWED ---------------------------   The nursing notes within the ED encounter and vital signs as below have been reviewed by myself.   /77

## 2022-07-25 LAB
EKG ATRIAL RATE: 468 BPM
EKG Q-T INTERVAL: 494 MS
EKG QRS DURATION: 164 MS
EKG QTC CALCULATION (BAZETT): 501 MS
EKG R AXIS: -67 DEGREES
EKG T AXIS: -45 DEGREES
EKG VENTRICULAR RATE: 62 BPM

## 2022-08-18 RX ORDER — SACUBITRIL AND VALSARTAN 97; 103 MG/1; MG/1
TABLET, FILM COATED ORAL
Qty: 60 TABLET | Refills: 9 | Status: SHIPPED | OUTPATIENT
Start: 2022-08-18

## 2023-01-27 ENCOUNTER — OFFICE VISIT (OUTPATIENT)
Dept: CARDIOLOGY CLINIC | Age: 78
End: 2023-01-27
Payer: MEDICARE

## 2023-01-27 VITALS
DIASTOLIC BLOOD PRESSURE: 64 MMHG | BODY MASS INDEX: 24.36 KG/M2 | WEIGHT: 174 LBS | RESPIRATION RATE: 12 BRPM | HEIGHT: 71 IN | SYSTOLIC BLOOD PRESSURE: 94 MMHG | HEART RATE: 74 BPM

## 2023-01-27 DIAGNOSIS — I48.20 CHRONIC ATRIAL FIBRILLATION (HCC): Primary | ICD-10-CM

## 2023-01-27 PROCEDURE — G8420 CALC BMI NORM PARAMETERS: HCPCS | Performed by: INTERNAL MEDICINE

## 2023-01-27 PROCEDURE — 3078F DIAST BP <80 MM HG: CPT | Performed by: INTERNAL MEDICINE

## 2023-01-27 PROCEDURE — 1123F ACP DISCUSS/DSCN MKR DOCD: CPT | Performed by: INTERNAL MEDICINE

## 2023-01-27 PROCEDURE — 99214 OFFICE O/P EST MOD 30 MIN: CPT | Performed by: INTERNAL MEDICINE

## 2023-01-27 PROCEDURE — G8484 FLU IMMUNIZE NO ADMIN: HCPCS | Performed by: INTERNAL MEDICINE

## 2023-01-27 PROCEDURE — 3074F SYST BP LT 130 MM HG: CPT | Performed by: INTERNAL MEDICINE

## 2023-01-27 PROCEDURE — 1036F TOBACCO NON-USER: CPT | Performed by: INTERNAL MEDICINE

## 2023-01-27 PROCEDURE — G8427 DOCREV CUR MEDS BY ELIG CLIN: HCPCS | Performed by: INTERNAL MEDICINE

## 2023-01-27 PROCEDURE — 93000 ELECTROCARDIOGRAM COMPLETE: CPT | Performed by: INTERNAL MEDICINE

## 2023-01-27 NOTE — PROGRESS NOTES
CHIEF COMPLAINT: CMP/Afib    HISTORY OF PRESENT ILLNESS: Patient is a 69 y.o. male seen at the request of Kennedi Moraes MD.      No CP or SOB. Medical history includes:   Hodgkins disease documented around 47 treated with chemotherapy only. Radiation not utilized. Currently in remission. Dilated cardiomyopathy presenting with CHF, Spring 2000. Cardiac catheterization, 08/2000. Normal coronary arteries with severely impaired LV systolic function. EF 15-20%. He was treated with ACE inhibitors, beta blockers, digoxin and diuretics with improvement. Echo, 04/07/2005. Mild MAC with mild MR. Mild TR with RVSP 30-35 mmHg. LV systolic and diastolic dilation - mild with mild diffuse hypokinesis. EF 40-45%. Stage I diastolic dysfunction. Pulmonary fibrosis. Paresthesias due to chemotherapy. Cigarette abuse, 45 pack years. Quit 06/2014. Exercise MPS, 07/02/2007. 12.5 METS, 75% MPHR. No chest pain or diagnostic ECG changes. Moderate LVE. EF 51%. Diaphragmatic attenuation, but no evidence for ischemia. Echo, 07/16/2007. Aortic sclerosis without stenosis or insufficiency. Normal LV size, wall motion and systolic function. Stage I diastolic dysfunction. Elevated LA pressures. Marked LAE. Moderate MALISSA. Mild TR. Doppler evidence for severe PHTN (RVSP 60-65 mmHg). Echo, 06/22/2009. Normal LV size with LV systolic function at the lower limits of normal. Stage II diastolic dysfunction. Elevated LA pressures. Mild RONALD. Mild TR, severe PHTN with RVSP about 65 mmHg. Allergy to aspirin. [facility] admission, 11/07/2014 with three to four episodes of slurred speech and left weakness consistent with TIA's. Presentation CBC, BMP, TnI x1 negative. PBNP 1606. CXR normal. CT head with no significant stenosis. MRI brain with multiple acute cerebral infarcts likely embolic in etiology. Echo, 11/08/2014. LVEF 50-55%. Stage III diastolic dysfunction. Severe LAE. E/E' 16. No valvular heart disease. Mild PHTN. CROW, 11/10/2014. Normal EF. No valvular heart disease. Severe SEC in the LA and LUZ. Dilated LA. No obvious thrombus seen. Low escape velocity from the LUZ. Event monitor, 11/15/2014. Strips available so far reveal predominantly sinus rhythm with IVCD. Multiple PAC's and PVC's. One episode of four beats of nonsustained VT. Event monitor, 12/18/2014. Significant burden of AF. AF present >21 hours on 12/13/2014 through 12/16/2014. Average HR in the 80's. Patient noted to be in AF/CVR during OV 01/15/2015. Echo, 06/10/2016. Normal LV size with mild CLVH. Severe global hypokinesis of the LV with estimated EF between 30-35% by biplane method of disks. E/E' ratio 15. Patient is in AF. No significant valve abnormalities noted. MUGA scan, 07/01/2016, mildly dilated left ventricle with global hypokinesis. Estimated ejection fraction 42%. No wall motion abnormality. Entresto therapy initiated 12/22/2016.      Past Medical History:   Diagnosis Date    Anticoagulant long-term use     Atrial fibrillation (HCC)     CHF (congestive heart failure) ([address] 2000    Diabetes ([address]     Diastolic dysfunction     Dilated cardiomyopathy ([address]     Hodgkin's disease ([address] 1993    treated with chemo    Hx of TIA (transient ischemic attack) and stroke     Hyperlipidemia     Hypertension     Hypothyroidism     Pulmonary fibrosis ([address]        Patient Active Problem List   Diagnosis    Dilated cardiomyopathy ([address]    TIA (transient ischemic attack)    HTN (hypertension)    Tobacco abuse    Chronic atrial fibrillation (HCC)    Chronic systolic CHF (congestive heart failure) (HCC)    Acute on chronic systolic CHF (congestive heart failure) ([address]    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)       Allergies   Allergen Reactions    Asa [Aspirin] Hives       Current Outpatient Medications   Medication Sig Dispense Refill    TORSEMIDE PO Take 20 mg by mouth as needed      sacubitril-valsartan (ENTRESTO)  MG per tablet TAKE 1 TABLET BY MOUTH TWICE A DAY 60 tablet 9    carvedilol (COREG) 25 MG tablet TAKE 1 TABLET BY MOUTH TWICE A DAY WITH MEALS 180 tablet 3    levothyroxine (SYNTHROID) 75 MCG tablet 75 mcg Daily       warfarin (COUMADIN) 5 MG tablet       insulin glargine (LANTUS) 100 UNIT/ML injection vial Inject 24 Units into the skin daily        No current facility-administered medications for this visit. Social History     Socioeconomic History    Marital status:      Spouse name: Not on file    Number of children: Not on file    Years of education: Not on file    Highest education level: Not on file   Occupational History    Not on file   Tobacco Use    Smoking status: Former     Packs/day: 1.50     Years: 30.00     Pack years: 45.00     Types: Cigarettes     Quit date: 2014     Years since quittin.6    Smokeless tobacco: Never   Vaping Use    Vaping Use: Never used   Substance and Sexual Activity    Alcohol use: No     Alcohol/week: 0.0 standard drinks    Drug use: No    Sexual activity: Not on file   Other Topics Concern    Not on file   Social History Narrative    Not on file     Social Determinants of Health     Financial Resource Strain: Not on file   Food Insecurity: Not on file   Transportation Needs: Not on file   Physical Activity: Not on file   Stress: Not on file   Social Connections: Not on file   Intimate Partner Violence: Not on file   Housing Stability: Not on file       Family History   Problem Relation Age of Onset    Cancer Mother     Heart Disease Father        Review of Systems:  Heart: as above   Lungs: as above   Eyes: denies changes in vision or discharge. Ears: denies changes in hearing or pain. Nose: denies epistaxis or masses   Throat: denies sore throat or trouble swallowing. Neuro: denies numbness, tingling, tremors. Skin: denies rashes or itching. : denies hematuria, dysuria   GI: denies vomiting, diarrhea   Psych: denies mood changed, anxiety, depression.   All other systems negative. Physical Exam   BP 94/64   Pulse 74   Resp 12   Ht 5' 11\" (1.803 m)   Wt 174 lb (78.9 kg)   BMI 24.27 kg/m²   Constitutional: Oriented to person, place, and time. Well-developed and well-nourished. No distress. Head: Normocephalic and atraumatic. Eyes: EOM are normal. Pupils are equal, round, and reactive to light. Neck: Normal range of motion. Neck supple. No hepatojugular reflux and no JVD present. Carotid bruit is not present. No tracheal deviation present. No thyromegaly present. Cardiovascular: Normal rate, irregular rhythm, normal heart sounds and intact distal pulses. Exam reveals no gallop and no friction rub. No murmur heard. Pulmonary/Chest: Effort normal and breath sounds normal. No respiratory distress. No wheezes. No rales. No tenderness. Abdominal: Soft. Bowel sounds are normal. No distension and no mass. No tenderness. No rebound and no guarding. Musculoskeletal: Normal range of motion. No edema and no tenderness. Lymphadenopathy:   No cervical adenopathy. No groin adenopathy. Neurological: Alert and oriented to person, place, and time. Skin: Skin is warm and dry. No rash noted. Not diaphoretic. No erythema. Psychiatric: Normal mood and affect. Behavior is normal.     EKG:  nonspecific ST and T waves changes, atrial fibrillation. PVC's,    ASSESSMENT AND PLAN:  Patient Active Problem List   Diagnosis    Dilated cardiomyopathy ([facility]    TIA (transient ischemic attack)    HTN (hypertension)    Tobacco abuse    Chronic atrial fibrillation (HCC)    Chronic systolic CHF (congestive heart failure) (HCC)    Acute on chronic systolic CHF (congestive heart failure) ([facility]    Cardiomyopathy secondary to drug ([facility]     1. CMP/CHF: Volume good. Continue coreg/entresto/demadex. 2. Afib: BB/warfarin. 3. HTN: Observe. 4. Pulm Fibrosis. 5. DM: Per PCP. Dada Rocha D.O.   Cardiologist  Cardiology, [address] [place] [address]

## 2023-06-23 RX ORDER — SACUBITRIL AND VALSARTAN 97; 103 MG/1; MG/1
TABLET, FILM COATED ORAL
Qty: 60 TABLET | Refills: 11 | Status: SHIPPED | OUTPATIENT
Start: 2023-06-23

## 2023-08-28 DIAGNOSIS — I42.7 CARDIOMYOPATHY SECONDARY TO DRUG (HCC): ICD-10-CM

## 2023-08-28 RX ORDER — CARVEDILOL 25 MG/1
TABLET ORAL
Qty: 180 TABLET | Refills: 3 | Status: SHIPPED | OUTPATIENT
Start: 2023-08-28

## 2024-01-29 ENCOUNTER — OFFICE VISIT (OUTPATIENT)
Dept: CARDIOLOGY CLINIC | Age: 79
End: 2024-01-29
Payer: MEDICARE

## 2024-01-29 VITALS
BODY MASS INDEX: 23.52 KG/M2 | RESPIRATION RATE: 16 BRPM | WEIGHT: 168 LBS | HEIGHT: 71 IN | DIASTOLIC BLOOD PRESSURE: 70 MMHG | SYSTOLIC BLOOD PRESSURE: 92 MMHG | HEART RATE: 66 BPM

## 2024-01-29 DIAGNOSIS — I48.20 CHRONIC ATRIAL FIBRILLATION (HCC): Primary | Chronic | ICD-10-CM

## 2024-01-29 DIAGNOSIS — I42.7 CARDIOMYOPATHY SECONDARY TO DRUG (HCC): ICD-10-CM

## 2024-01-29 PROCEDURE — 3078F DIAST BP <80 MM HG: CPT | Performed by: INTERNAL MEDICINE

## 2024-01-29 PROCEDURE — 3074F SYST BP LT 130 MM HG: CPT | Performed by: INTERNAL MEDICINE

## 2024-01-29 PROCEDURE — 1123F ACP DISCUSS/DSCN MKR DOCD: CPT | Performed by: INTERNAL MEDICINE

## 2024-01-29 PROCEDURE — 99214 OFFICE O/P EST MOD 30 MIN: CPT | Performed by: INTERNAL MEDICINE

## 2024-01-29 PROCEDURE — 93000 ELECTROCARDIOGRAM COMPLETE: CPT | Performed by: INTERNAL MEDICINE

## 2024-01-29 NOTE — PROGRESS NOTES
Patient presented today and was given Entresto 49-51mg. The medication is being monitored by Dr. Hamilton.   Sample drug name: Entresto 49-51 mg  Sample drug lot #: [id number]  Sample drug expiration date: 08/25  How many sample boxes and/or bottles given: 3 bottles  (84 tablets)    Patient presented today and was given Entresto 49/51 mg. The medication is being monitored by Dr. Hamilton.   Sample drug name: Entresto 49-51 mg  Sample drug lot #: [id number]  Sample drug expiration date: 08/2025  How many sample boxes and/or bottles given: 1 bottle (28 tabs)    
per tablet TAKE 1 TABLET BY MOUTH TWICE A DAY 60 tablet 11    TORSEMIDE PO Take 20 mg by mouth as needed      levothyroxine (SYNTHROID) 75 MCG tablet Take 100 mcg by mouth Daily      warfarin (COUMADIN) 5 MG tablet Take 1 tablet by mouth daily      insulin glargine (LANTUS) 100 UNIT/ML injection vial Inject 28 Units into the skin daily       No current facility-administered medications for this visit.       Social History     Socioeconomic History    Marital status:      Spouse name: Not on file    Number of children: Not on file    Years of education: Not on file    Highest education level: Not on file   Occupational History    Not on file   Tobacco Use    Smoking status: Former     Current packs/day: 0.00     Average packs/day: 1.5 packs/day for 30.0 years (45.0 ttl pk-yrs)     Types: Cigarettes     Start date: 1984     Quit date: 2014     Years since quittin.6    Smokeless tobacco: Never   Vaping Use    Vaping Use: Never used   Substance and Sexual Activity    Alcohol use: No     Alcohol/week: 0.0 standard drinks of alcohol    Drug use: No    Sexual activity: Not on file   Other Topics Concern    Not on file   Social History Narrative    Not on file     Social Determinants of Health     Financial Resource Strain: Not on file   Food Insecurity: Not on file   Transportation Needs: Not on file   Physical Activity: Not on file   Stress: Not on file   Social Connections: Not on file   Intimate Partner Violence: Not on file   Housing Stability: Not on file       Family History   Problem Relation Age of Onset    Cancer Mother     Heart Disease Father      Review of Systems:  Heart: as above   Lungs: as above   Eyes: denies changes in vision or discharge.   Ears: denies changes in hearing or pain.   Nose: denies epistaxis or masses   Throat: denies sore throat or trouble swallowing.   Neuro: denies numbness, tingling, tremors.   Skin: denies rashes or itching.   : denies hematuria, dysuria   GI:

## 2024-03-04 ENCOUNTER — TELEPHONE (OUTPATIENT)
Dept: ENT CLINIC | Age: 79
End: 2024-03-04

## 2024-03-04 NOTE — TELEPHONE ENCOUNTER
I spoke with patient, he is added to the waitlist and will be called if there is any cancellations.     Electronically signed by Nikki Galaviz MA on 3/4/24 at 3:51 PM EST

## 2024-03-04 NOTE — TELEPHONE ENCOUNTER
Patient scheduled 5/3/24. New pt Bilateral wax removal, decreased hearing. Patient requesting to be seen sooner. [phone]

## 2024-04-16 ENCOUNTER — HOSPITAL ENCOUNTER (OUTPATIENT)
Dept: DIABETES SERVICES | Age: 79
Setting detail: THERAPIES SERIES
Discharge: HOME | End: 2024-04-16
Payer: MEDICARE

## 2024-04-16 PROCEDURE — G0109 DIAB MANAGE TRN IND/GROUP: HCPCS

## 2024-04-16 SDOH — ECONOMIC STABILITY: FOOD INSECURITY: ADDITIONAL INFORMATION: NO

## 2024-04-16 ASSESSMENT — PROBLEM AREAS IN DIABETES QUESTIONNAIRE (PAID)
PAID-5 TOTAL SCORE: 4
WORRYING ABOUT THE FUTURE AND THE POSSIBILITY OF SERIOUS COMPLICATIONS: SERIOUS PROBLEM
FEELING SCARED WHEN YOU THINK ABOUT LIVING WITH DIABETES: NOT A PROBLEM
FEELING THAT DIABETES IS TAKING UP TOO MUCH OF YOUR MENTAL AND PHYSICAL ENERGY EVERY DAY: NOT A PROBLEM
COPING WITH COMPLICATIONS OF DIABETES: NOT A PROBLEM
FEELING DEPRESSED WHEN YOU THINK ABOUT LIVING WITH DIABETES: NOT A PROBLEM

## 2024-04-17 ENCOUNTER — HOSPITAL ENCOUNTER (OUTPATIENT)
Dept: DIABETES SERVICES | Age: 79
Setting detail: THERAPIES SERIES
Discharge: HOME | End: 2024-04-17
Payer: MEDICARE

## 2024-04-17 PROCEDURE — G0109 DIAB MANAGE TRN IND/GROUP: HCPCS

## 2024-04-17 NOTE — PROGRESS NOTES
supplies management 1 []      Using medications safely:   -State effects of diabetes medicines on blood glucose levels;  -List diabetes medication taken, action & side effects 1 [x] All     []  []  4    Insulin/Injectables/glucagon  -Name appropriate injection sites; proper storage; supplies needed;  1   [x]  4 4/16/24 KMS    Lantus 18 units daily    Novolog before breakfast and dinner using sliding scale     Demonstrate proper technique N/A []      Monitoring blood glucose, interpreting and using results:   -Identify the purpose of testing   -Identify recommended & personal blood glucose targets & HgbA1C target levels  -State the Importance of logging blood glucose levels for pattern recognition;   -State benefits of reading/using pt generated health data  -Verbalize safe lancet disposal/CGM 1 [x] All     []  []    []  []  []  4 4/16/24 KMS  Awaiting CGM to be delivered   Monitors twice daily    -Demonstrate proper testing technique N/A []      Incorporating physical activity into lifestyle:   -State effect of exercise on blood glucose levels;   -State benefits of regular exercise;   -Define safety considerations/food choices if needed.  -Describe contraindications/maintenance of activity. 1 [x] All     []  []    []  []  4 4/16/24 KMS  Yard work, walking   Incorporating nutritional management into lifestyle:   -Describe effect of type, amount & timing of food on blood glucose  -Describe methods for preparing and planning   healthy meals  -Correctly read food labels for nutritional values  -Name 3 foods high in Carbohydrate  -Plan a sample 4 carbohydrate-controlled meal using Diabetes Plate Method  -Verbalized ability to measure and count carbohydrate gram servings using food labels and carbohydrate food list.    -Plan a carbohydrate-controlled meal based on individual needs/preferences from a Registered Dietitian.   1 [] All       []    []    []  []      []        []                      Developing strategies for

## 2024-04-18 NOTE — PROGRESS NOTES
supplies management 1 [x]4/17/24 CS  4    Using medications safely:   -State effects of diabetes medicines on blood glucose levels;  -List diabetes medication taken, action & side effects 1 [x] All     []  []  4    Insulin/Injectables/glucagon  -Name appropriate injection sites; proper storage; supplies needed;  1   [x]  4 4/16/24 KMS    Lantus 18 units daily    Novolog before breakfast and dinner using sliding scale     Demonstrate proper technique N/A []      Monitoring blood glucose, interpreting and using results:   -Identify the purpose of testing   -Identify recommended & personal blood glucose targets & HgbA1C target levels  -State the Importance of logging blood glucose levels for pattern recognition;   -State benefits of reading/using pt generated health data  -Verbalize safe lancet disposal/CGM 1 [x] All     []  []    []  []  []  4 4/16/24 KMS  Awaiting CGM to be delivered   Monitors twice daily    -Demonstrate proper testing technique N/A []      Incorporating physical activity into lifestyle:   -State effect of exercise on blood glucose levels;   -State benefits of regular exercise;   -Define safety considerations/food choices if needed.  -Describe contraindications/maintenance of activity. 1 [x] All     []  []    []  []  4 4/16/24 KMS  Yard work, walking   Incorporating nutritional management into lifestyle:   -Describe effect of type, amount & timing of food on blood glucose  -Describe methods for preparing and planning   healthy meals  -Correctly read food labels for nutritional values  -Name 3 foods high in Carbohydrate  -Plan a sample 4 carbohydrate-controlled meal using Diabetes Plate Method  -Verbalized ability to measure and count carbohydrate gram servings using food labels and carbohydrate food list.    -Plan a carbohydrate-controlled meal based on individual needs/preferences from a Registered Dietitian.   1 [] All       [x]  4/17/24 CS  [x]4/17/24 CS    [x]4/17/24 CS  [x]4/17/24

## 2024-05-03 ENCOUNTER — OFFICE VISIT (OUTPATIENT)
Dept: ENT CLINIC | Age: 79
End: 2024-05-03

## 2024-05-03 ENCOUNTER — PROCEDURE VISIT (OUTPATIENT)
Dept: AUDIOLOGY | Age: 79
End: 2024-05-03
Payer: MEDICARE

## 2024-05-03 VITALS
BODY MASS INDEX: 23.1 KG/M2 | WEIGHT: 165 LBS | HEART RATE: 62 BPM | DIASTOLIC BLOOD PRESSURE: 83 MMHG | HEIGHT: 71 IN | SYSTOLIC BLOOD PRESSURE: 144 MMHG | OXYGEN SATURATION: 95 %

## 2024-05-03 DIAGNOSIS — H61.23 BILATERAL IMPACTED CERUMEN: Primary | ICD-10-CM

## 2024-05-03 DIAGNOSIS — H91.8X3 ASYMMETRICAL HEARING LOSS: ICD-10-CM

## 2024-05-03 DIAGNOSIS — Z01.818 PRE-OP TESTING: ICD-10-CM

## 2024-05-03 DIAGNOSIS — H90.3 SENSORINEURAL HEARING LOSS (SNHL) OF BOTH EARS: ICD-10-CM

## 2024-05-03 DIAGNOSIS — H90.3 SENSORINEURAL HEARING LOSS (SNHL) OF BOTH EARS: Primary | ICD-10-CM

## 2024-05-03 PROCEDURE — 92567 TYMPANOMETRY: CPT | Performed by: AUDIOLOGIST

## 2024-05-03 PROCEDURE — 92557 COMPREHENSIVE HEARING TEST: CPT | Performed by: AUDIOLOGIST

## 2024-05-03 RX ORDER — ACETAMINOPHEN 160 MG
TABLET,DISINTEGRATING ORAL
COMMUNITY

## 2024-05-03 ASSESSMENT — ENCOUNTER SYMPTOMS
SINUS PRESSURE: 0
VOMITING: 0
EYE PAIN: 0
SHORTNESS OF BREATH: 0
ALLERGIC/IMMUNOLOGIC NEGATIVE: 1
RHINORRHEA: 1
BACK PAIN: 0
DIARRHEA: 0
SORE THROAT: 0
EYE DISCHARGE: 0
COUGH: 0

## 2024-05-03 NOTE — PROGRESS NOTES
This patient was referred for audiometric/tympanometric testing by LAURA Otero due to hearing loss.      Audiometry using pure tone air and bone conduction revealed hearing sensitivity within normal limits  Through 500 Hz sloping to a mild sensorineural hearing loss through 4000 Hz sloping to moderately severe sensorineural hearing loss in the right ear. Left ear revealed hearing sensitivity within normal limits through 500 Hz sloping to mild through 3000 Hz sloping to severe at 8000 Hz.   Asymmetries were noted and discussed with provider.   Reliability was good. Speech reception thresholds were in good agreement with the pure tone averages, bilaterally. Speech discrimination scores were excellent, bilaterally.    Tympanometry revealed normal middle ear peak pressure and compliance, bilaterally.        The results were reviewed with the patient and CNP.     Recommendations for follow up will be made pending ordering provider consult.    Maryana Gill/CCC-A  [place] Lic # [id number]

## 2024-05-03 NOTE — PROGRESS NOTES
Subjective:     Patient ID:  Hemant Castillo is a 78 y.o. male.    HPI:    Hearing Loss  Patient presents today with complaints of hearing loss.  Concern regarding hearing has been present for 4 years. He has failed a prior hearing test.The patient reports difficulty hearing, turning up the T.V., saying \"huh\" or \"what\".     Patient does not have hearing aids at this time.  History of Head trauma: no   Description: none  History of surgery to the head/neck: yes   Description:Had \"some glands removed from the left neck\" by Dr. Reynolds  History of cerumen impaction: yes  History of noise exposure: yes   Type: \"worked in a steel mill all of my life\"  Spinning: no  Hearing loss: yes    Fluctuating: yes  Aural pressure: yes  Tinnitus:yes intermittently has not had in a few weeks.   Otalgia:no    Past Medical History:   Diagnosis Date    Anticoagulant long-term use     Atrial fibrillation (HCC)     CHF (congestive heart failure) (HCC) 2000    Diabetes (HCC)     Diastolic dysfunction     Dilated cardiomyopathy (HCC)     Hodgkin's disease (HCC) 1993    treated with chemo    Hx of TIA (transient ischemic attack) and stroke     Hyperlipidemia     Hypertension     Hypothyroidism     Pulmonary fibrosis (HCC)      Past Surgical History:   Procedure Laterality Date    CYST REMOVAL  5/15/14    cylindroma Dr Adame @ [facility]    DIAGNOSTIC CARDIAC CATH LAB PROCEDURE  8/2000    EYE SURGERY Bilateral 1/2013 2/2013    cataracts/lens implants    AL EXC PRTD MARY/PRTD GLND TOT DSJ&PRSRV FACIAL NR Left 11/8/2018    EXCISION OF LEFT PAROTID MASS WITH NERVE INTEGRITY MONITOR performed by Ben Reynolds Jr., MD at [facility] OR    SKIN CANCER EXCISION  10/2016    Basal Cell    TONSILLECTOMY      TRANSESOPHAGEAL ECHOCARDIOGRAM       Family History   Problem Relation Age of Onset    Cancer Mother     Heart Disease Father      Social History     Socioeconomic History    Marital status:      Spouse name: None    Number of children:

## 2024-05-06 ENCOUNTER — TELEPHONE (OUTPATIENT)
Dept: ENT CLINIC | Age: 79
End: 2024-05-06

## 2024-05-06 NOTE — TELEPHONE ENCOUNTER
Authorization for MRI IAC obtained through alike.  Auth #: [id number]  Valid 5/6/24-11/2/24.  Order faxed to [facility].    Electronically signed by Arelis Mccoy MA on 5/6/24 at 10:54 AM EDT

## 2024-05-17 DIAGNOSIS — H91.8X3 ASYMMETRICAL HEARING LOSS: ICD-10-CM

## 2024-05-17 DIAGNOSIS — H90.3 SENSORINEURAL HEARING LOSS (SNHL) OF BOTH EARS: ICD-10-CM

## 2024-05-21 ENCOUNTER — TELEPHONE (OUTPATIENT)
Dept: ENT CLINIC | Age: 79
End: 2024-05-21

## 2024-05-21 NOTE — TELEPHONE ENCOUNTER
I did not call patient for anything.     Electronically signed by Arelis Mccoy MA on 5/21/24 at 4:00 PM EDT

## 2024-05-21 NOTE — TELEPHONE ENCOUNTER
Pt called onto the office saying he was returning a call. There are no notes so unsure of who/why pt was called. Told him somone would call him back if needed. Electronically signed by Clover Cerda on 5/21/2024 at 11:44 AM

## 2024-05-21 NOTE — TELEPHONE ENCOUNTER
Please advise if this was for imaging    Electronically signed by Nikki Galaviz MA on 5/21/24 at 1:19 PM EDT

## 2024-06-11 ENCOUNTER — TELEPHONE (OUTPATIENT)
Dept: CARDIOLOGY CLINIC | Age: 79
End: 2024-06-11

## 2024-06-11 NOTE — TELEPHONE ENCOUNTER
Okay to proceed. Okay to hold warfarin for 5 days prior and restart when dentist says it is okay.    Danielle Hamilton D.O.  Cardiologist  Cardiology, [facility]

## 2024-06-11 NOTE — TELEPHONE ENCOUNTER
Patient states that he is scheduled for a dental procedure in August, he will have 12 teeth extracted and  needs instructions regarding holding warfarin, please advise

## 2024-06-27 RX ORDER — SACUBITRIL AND VALSARTAN 97; 103 MG/1; MG/1
TABLET, FILM COATED ORAL
Qty: 60 TABLET | Refills: 11 | Status: SHIPPED | OUTPATIENT
Start: 2024-06-27

## 2024-06-28 ENCOUNTER — OFFICE VISIT (OUTPATIENT)
Dept: ENT CLINIC | Age: 79
End: 2024-06-28
Payer: MEDICARE

## 2024-06-28 VITALS
TEMPERATURE: 97.3 F | WEIGHT: 159 LBS | BODY MASS INDEX: 22.26 KG/M2 | DIASTOLIC BLOOD PRESSURE: 62 MMHG | SYSTOLIC BLOOD PRESSURE: 100 MMHG | HEIGHT: 71 IN

## 2024-06-28 DIAGNOSIS — H90.3 SENSORINEURAL HEARING LOSS (SNHL) OF BOTH EARS: Primary | ICD-10-CM

## 2024-06-28 DIAGNOSIS — H91.8X3 ASYMMETRICAL HEARING LOSS: ICD-10-CM

## 2024-06-28 PROCEDURE — 99212 OFFICE O/P EST SF 10 MIN: CPT

## 2024-06-28 PROCEDURE — 1123F ACP DISCUSS/DSCN MKR DOCD: CPT

## 2024-06-28 PROCEDURE — 3074F SYST BP LT 130 MM HG: CPT

## 2024-06-28 PROCEDURE — 3078F DIAST BP <80 MM HG: CPT

## 2024-06-28 NOTE — PROGRESS NOTES
Subjective:      Patient ID:  Hemant Castillo is a 78 y.o. male.    HPI:    Pt returns for review of MRI of the head for left asymmetrical sensorineural hearing loss.  There are not changes since last visit. Continues to have difficulty hearing but does state it is better since wax removal.     Past Medical History:   Diagnosis Date    Anticoagulant long-term use     Atrial fibrillation (HCC)     CHF (congestive heart failure) (HCC) 2000    Diabetes (HCC)     Diastolic dysfunction     Dilated cardiomyopathy (HCC)     Hodgkin's disease (HCC) 1993    treated with chemo    Hx of TIA (transient ischemic attack) and stroke     Hyperlipidemia     Hypertension     Hypothyroidism     Pulmonary fibrosis (HCC)      Past Surgical History:   Procedure Laterality Date    CYST REMOVAL  5/15/14    cylindroma Dr Adame @ [facility]    DIAGNOSTIC CARDIAC CATH LAB PROCEDURE  8/2000    EYE SURGERY Bilateral 1/2013 2/2013    cataracts/lens implants    AL EXC PRTD MARY/PRTD GLND TOT DSJ&PRSRV FACIAL NR Left 11/8/2018    EXCISION OF LEFT PAROTID MASS WITH NERVE INTEGRITY MONITOR performed by Ben Reynolds Jr., MD at [facility] OR    SKIN CANCER EXCISION  10/2016    Basal Cell    TONSILLECTOMY      TRANSESOPHAGEAL ECHOCARDIOGRAM       Family History   Problem Relation Age of Onset    Cancer Mother     Heart Disease Father      Social History     Socioeconomic History    Marital status:      Spouse name: None    Number of children: None    Years of education: None    Highest education level: None   Tobacco Use    Smoking status: Former     Current packs/day: 0.00     Average packs/day: 1.5 packs/day for 30.0 years (45.0 ttl pk-yrs)     Types: Cigarettes     Start date: 6/4/1984     Quit date: 6/4/2014     Years since quitting: 10.1    Smokeless tobacco: Never   Vaping Use    Vaping Use: Never used   Substance and Sexual Activity    Alcohol use: No     Alcohol/week: 0.0 standard drinks of alcohol    Drug use: No     Allergies

## 2024-08-02 DIAGNOSIS — I42.7 CARDIOMYOPATHY SECONDARY TO DRUG (HCC): ICD-10-CM

## 2024-08-05 RX ORDER — CARVEDILOL 25 MG/1
25 TABLET ORAL 2 TIMES DAILY WITH MEALS
Qty: 180 TABLET | Refills: 3 | Status: SHIPPED | OUTPATIENT
Start: 2024-08-05

## 2024-10-11 ENCOUNTER — OFFICE VISIT (OUTPATIENT)
Dept: ENT CLINIC | Age: 79
End: 2024-10-11
Payer: MEDICARE

## 2024-10-11 VITALS
WEIGHT: 158 LBS | DIASTOLIC BLOOD PRESSURE: 77 MMHG | HEART RATE: 60 BPM | TEMPERATURE: 97.2 F | SYSTOLIC BLOOD PRESSURE: 140 MMHG | BODY MASS INDEX: 22.12 KG/M2 | HEIGHT: 71 IN | OXYGEN SATURATION: 98 %

## 2024-10-11 DIAGNOSIS — H61.23 BILATERAL IMPACTED CERUMEN: Primary | ICD-10-CM

## 2024-10-11 PROCEDURE — 69210 REMOVE IMPACTED EAR WAX UNI: CPT

## 2024-10-11 PROCEDURE — 1123F ACP DISCUSS/DSCN MKR DOCD: CPT

## 2024-10-11 PROCEDURE — 3077F SYST BP >= 140 MM HG: CPT

## 2024-10-11 PROCEDURE — 3078F DIAST BP <80 MM HG: CPT

## 2024-10-11 PROCEDURE — 99213 OFFICE O/P EST LOW 20 MIN: CPT

## 2024-10-11 ASSESSMENT — ENCOUNTER SYMPTOMS
VOMITING: 0
SORE THROAT: 0
ALLERGIC/IMMUNOLOGIC NEGATIVE: 1
EYE DISCHARGE: 0
BACK PAIN: 0
SHORTNESS OF BREATH: 0
RHINORRHEA: 0
EYE PAIN: 0
DIARRHEA: 0
SINUS PRESSURE: 0
COUGH: 0

## 2024-10-11 NOTE — PROGRESS NOTES
Subjective:      Patient ID:  Hemant Castillo is a 79 y.o. male.    HPI:    Pt presents with a history of cerumen impaction removal.   The patients ear was last cleaned 4 month(s) ago.   The patient was not using ear drops to loosen wax immediately prior to this visit.  Hearing aids: no  Denies ear pain or pressure.     Past Medical History:   Diagnosis Date    Anticoagulant long-term use     Atrial fibrillation (HCC)     CHF (congestive heart failure) (HCC) 2000    Diabetes (HCC)     Diastolic dysfunction     Dilated cardiomyopathy (HCC)     Hodgkin's disease (HCC) 1993    treated with chemo    Hx of TIA (transient ischemic attack) and stroke     Hyperlipidemia     Hypertension     Hypothyroidism     Pulmonary fibrosis (HCC)      Past Surgical History:   Procedure Laterality Date    CYST REMOVAL  5/15/14    cylindroma Dr Adame @ [facility]    DIAGNOSTIC CARDIAC CATH LAB PROCEDURE  8/2000    EYE SURGERY Bilateral 1/2013 2/2013    cataracts/lens implants    ND EXC PRTD MARY/PRTD GLND TOT DSJ&PRSRV FACIAL NR Left 11/8/2018    EXCISION OF LEFT PAROTID MASS WITH NERVE INTEGRITY MONITOR performed by Ben Reynolds Jr., MD at [facility] OR    SKIN CANCER EXCISION  10/2016    Basal Cell    TONSILLECTOMY      TRANSESOPHAGEAL ECHOCARDIOGRAM       Family History   Problem Relation Age of Onset    Cancer Mother     Heart Disease Father      Social History     Socioeconomic History    Marital status:      Spouse name: None    Number of children: None    Years of education: None    Highest education level: None   Tobacco Use    Smoking status: Former     Current packs/day: 0.00     Average packs/day: 1.5 packs/day for 30.0 years (45.0 ttl pk-yrs)     Types: Cigarettes     Start date: 6/4/1984     Quit date: 6/4/2014     Years since quitting: 10.3    Smokeless tobacco: Never   Vaping Use    Vaping status: Never Used   Substance and Sexual Activity    Alcohol use: No     Alcohol/week: 0.0 standard drinks of alcohol

## 2024-11-13 ENCOUNTER — TELEPHONE (OUTPATIENT)
Dept: CARDIOLOGY CLINIC | Age: 79
End: 2024-11-13

## 2025-01-24 ENCOUNTER — OFFICE VISIT (OUTPATIENT)
Dept: CARDIOLOGY CLINIC | Age: 80
End: 2025-01-24
Payer: MEDICARE

## 2025-01-24 VITALS
TEMPERATURE: 97.3 F | HEART RATE: 76 BPM | WEIGHT: 160.2 LBS | HEIGHT: 71 IN | SYSTOLIC BLOOD PRESSURE: 124 MMHG | RESPIRATION RATE: 18 BRPM | DIASTOLIC BLOOD PRESSURE: 72 MMHG | OXYGEN SATURATION: 94 % | BODY MASS INDEX: 22.43 KG/M2

## 2025-01-24 DIAGNOSIS — I48.20 CHRONIC ATRIAL FIBRILLATION (HCC): Primary | Chronic | ICD-10-CM

## 2025-01-24 PROCEDURE — 1123F ACP DISCUSS/DSCN MKR DOCD: CPT | Performed by: INTERNAL MEDICINE

## 2025-01-24 PROCEDURE — 99214 OFFICE O/P EST MOD 30 MIN: CPT | Performed by: INTERNAL MEDICINE

## 2025-01-24 PROCEDURE — 3074F SYST BP LT 130 MM HG: CPT | Performed by: INTERNAL MEDICINE

## 2025-01-24 PROCEDURE — 93000 ELECTROCARDIOGRAM COMPLETE: CPT | Performed by: INTERNAL MEDICINE

## 2025-01-24 PROCEDURE — 3078F DIAST BP <80 MM HG: CPT | Performed by: INTERNAL MEDICINE

## 2025-01-24 PROCEDURE — 1159F MED LIST DOCD IN RCRD: CPT | Performed by: INTERNAL MEDICINE

## 2025-01-24 NOTE — PROGRESS NOTES
CHIEF COMPLAINT: CMP/Afib    HISTORY OF PRESENT ILLNESS: Patient is a 79 y.o. male seen at the request of Luis Carr MD.      No CP or SOB.     Medical history includes:   Hodgkin’s disease documented around 1993 treated with chemotherapy only. Radiation not utilized. Currently in remission.  Dilated cardiomyopathy presenting with CHF, Spring 2000. Cardiac catheterization, 08/2000. Normal coronary arteries with severely impaired LV systolic function. EF 15-20%. He was treated with ACE inhibitors, beta blockers, digoxin and diuretics with improvement.    Echo, 04/07/2005. Mild MAC with mild MR. Mild TR with RVSP 30-35 mmHg. LV systolic and diastolic dilation - mild with mild diffuse hypokinesis. EF 40-45%. Stage I diastolic dysfunction.    Pulmonary fibrosis.    Paresthesias due to chemotherapy.    Cigarette abuse, 45 pack years. Quit 06/2014.    Exercise MPS, 07/02/2007. 12.5 METS, 75% MPHR. No chest pain or diagnostic ECG changes. Moderate LVE. EF 51%. Diaphragmatic attenuation, but no evidence for ischemia.  Echo, 07/16/2007. Aortic sclerosis without stenosis or insufficiency. Normal LV size, wall motion and systolic function. Stage I diastolic dysfunction. Elevated LA pressures. Marked LAE. Moderate MALISSA. Mild TR. Doppler evidence for severe PHTN (RVSP 60-65 mmHg).  Echo, 06/22/2009. Normal LV size with LV systolic function at the lower limits of normal. Stage II diastolic dysfunction. Elevated LA pressures. Mild RONALD. Mild TR, severe PHTN with RVSP about 65 mmHg.  Allergy to aspirin.  [facility] admission, 11/07/2014 with three to four episodes of slurred speech and left weakness consistent with TIA's. Presentation CBC, BMP, TnI x1 negative. PBNP 1606. CXR normal. CT head with no significant stenosis. MRI brain with multiple acute cerebral infarcts likely embolic in etiology.  Echo, 11/08/2014. LVEF 50-55%. Stage III diastolic dysfunction. Severe LAE. E/E' 16. No valvular heart disease. Mild PHTN.

## 2025-02-14 ENCOUNTER — OFFICE VISIT (OUTPATIENT)
Dept: ENT CLINIC | Age: 80
End: 2025-02-14
Payer: MEDICARE

## 2025-02-14 VITALS
BODY MASS INDEX: 22.57 KG/M2 | WEIGHT: 161.2 LBS | SYSTOLIC BLOOD PRESSURE: 116 MMHG | DIASTOLIC BLOOD PRESSURE: 69 MMHG | HEIGHT: 71 IN | OXYGEN SATURATION: 97 % | HEART RATE: 86 BPM

## 2025-02-14 DIAGNOSIS — H61.23 BILATERAL IMPACTED CERUMEN: Primary | ICD-10-CM

## 2025-02-14 PROCEDURE — 69210 REMOVE IMPACTED EAR WAX UNI: CPT

## 2025-02-14 NOTE — PROGRESS NOTES
Subjective:      Patient ID:  Hemant Castillo is a 79 y.o. male.    HPI:    Pt presents with a history of cerumen impaction removal.   The patients ear was last cleaned 4 month(s) ago.   The patient was not using ear drops to loosen wax immediately prior to this visit.    Hearing aids: no    Denies ear pain or pressure.     Past Medical History:   Diagnosis Date    Anticoagulant long-term use     Atrial fibrillation (HCC)     CHF (congestive heart failure) (HCC) 2000    Diabetes (HCC)     Diastolic dysfunction     Dilated cardiomyopathy (HCC)     Hodgkin's disease (HCC) 1993    treated with chemo    Hx of TIA (transient ischemic attack) and stroke     Hyperlipidemia     Hypertension     Hypothyroidism     Pulmonary fibrosis (HCC)      Past Surgical History:   Procedure Laterality Date    CYST REMOVAL  5/15/14    cylindroma Dr Adame @ [facility]    DIAGNOSTIC CARDIAC CATH LAB PROCEDURE  8/2000    EYE SURGERY Bilateral 1/2013 2/2013    cataracts/lens implants    VT EXC PRTD MARY/PRTD GLND TOT DSJ&PRSRV FACIAL NR Left 11/8/2018    EXCISION OF LEFT PAROTID MASS WITH NERVE INTEGRITY MONITOR performed by Ben Reynolds Jr., MD at [facility] OR    SKIN CANCER EXCISION  10/2016    Basal Cell    TONSILLECTOMY      TRANSESOPHAGEAL ECHOCARDIOGRAM       Family History   Problem Relation Age of Onset    Cancer Mother     Heart Disease Father      Social History     Socioeconomic History    Marital status:      Spouse name: None    Number of children: None    Years of education: None    Highest education level: None   Tobacco Use    Smoking status: Former     Current packs/day: 0.00     Average packs/day: 1.5 packs/day for 30.0 years (45.0 ttl pk-yrs)     Types: Cigarettes     Start date: 6/4/1984     Quit date: 6/4/2014     Years since quitting: 10.7    Smokeless tobacco: Never   Vaping Use    Vaping status: Never Used   Substance and Sexual Activity    Alcohol use: No     Alcohol/week: 0.0 standard drinks of

## 2025-07-21 RX ORDER — SACUBITRIL AND VALSARTAN 97; 103 MG/1; MG/1
1 TABLET, FILM COATED ORAL 2 TIMES DAILY
Qty: 60 TABLET | Refills: 11 | Status: SHIPPED | OUTPATIENT
Start: 2025-07-21

## 2025-08-22 ENCOUNTER — TELEPHONE (OUTPATIENT)
Dept: CARDIOLOGY CLINIC | Age: 80
End: 2025-08-22

## 2025-08-22 ENCOUNTER — OFFICE VISIT (OUTPATIENT)
Dept: ENT CLINIC | Age: 80
End: 2025-08-22

## 2025-08-22 ENCOUNTER — PROCEDURE VISIT (OUTPATIENT)
Dept: AUDIOLOGY | Age: 80
End: 2025-08-22

## 2025-08-22 VITALS
BODY MASS INDEX: 23.34 KG/M2 | WEIGHT: 166.7 LBS | TEMPERATURE: 97.9 F | HEART RATE: 69 BPM | HEIGHT: 71 IN | SYSTOLIC BLOOD PRESSURE: 102 MMHG | DIASTOLIC BLOOD PRESSURE: 61 MMHG

## 2025-08-22 DIAGNOSIS — H90.3 SENSORINEURAL HEARING LOSS (SNHL) OF BOTH EARS: ICD-10-CM

## 2025-08-22 DIAGNOSIS — H90.3 SENSORY HEARING LOSS, BILATERAL: Primary | ICD-10-CM

## 2025-08-22 DIAGNOSIS — H91.8X3 ASYMMETRICAL HEARING LOSS: ICD-10-CM

## 2025-08-22 DIAGNOSIS — H61.23 CERUMEN DEBRIS ON TYMPANIC MEMBRANE OF BOTH EARS: ICD-10-CM

## 2025-08-22 DIAGNOSIS — H61.23 BILATERAL IMPACTED CERUMEN: Primary | ICD-10-CM

## 2025-08-25 DIAGNOSIS — R06.02 SHORTNESS OF BREATH: Primary | ICD-10-CM

## 2025-08-25 DIAGNOSIS — I50.22 CHRONIC SYSTOLIC CHF (CONGESTIVE HEART FAILURE) (HCC): Chronic | ICD-10-CM

## 2025-08-25 RX ORDER — REGADENOSON 0.08 MG/ML
0.4 INJECTION, SOLUTION INTRAVENOUS
OUTPATIENT
Start: 2025-08-25

## 2025-08-26 ENCOUNTER — TELEPHONE (OUTPATIENT)
Dept: CARDIOLOGY | Age: 80
End: 2025-08-26

## (undated) DEVICE — GOWN,SIRUS,FABRNF,XL,20/CS: Brand: MEDLINE

## (undated) DEVICE — E-Z CLEAN, NON-STICK, PTFE COATED, ELECTROSURGICAL NEEDLE ELECTRODE, MODIFIED EXTENDED INSULATION, 2.75 INCH (7 CM): Brand: MEGADYNE

## (undated) DEVICE — STERILE COTTON BALLS LARGE 5/P: Brand: MEDLINE

## (undated) DEVICE — SURGICAL NERVE STIMULATOR/LOCATOR: Brand: CHECKPOINT HEAD & NECK

## (undated) DEVICE — GOWN,SIRUS,FABRNF,L,20/CS: Brand: MEDLINE

## (undated) DEVICE — BLADE CLIPPER GEN PURP NS

## (undated) DEVICE — PROBE 8225101 5PK STD PRASS FL TIP ROHS

## (undated) DEVICE — DRAIN SURG 15FR SIL RND CHN W/ TRCR FULL FLUT DBL WRP TRAD

## (undated) DEVICE — CORD,CAUTERY,BIPOLAR,STERILE: Brand: MEDLINE

## (undated) DEVICE — TOWEL,OR,DSP,ST,BLUE,STD,6/PK,12PK/CS: Brand: MEDLINE

## (undated) DEVICE — INTENDED FOR TISSUE SEPARATION, AND OTHER PROCEDURES THAT REQUIRE A SHARP SURGICAL BLADE TO PUNCTURE OR CUT.: Brand: BARD-PARKER ® STAINLESS STEEL BLADES

## (undated) DEVICE — SURGICAL PROCEDURE PACK EENT CUST

## (undated) DEVICE — STANDARD HYPODERMIC NEEDLE,ALUMINUM HUB: Brand: MONOJECT

## (undated) DEVICE — ELECTRODE 8227411 PAIRED 4 CH SET ROHS

## (undated) DEVICE — SPONGE,DISSECTOR,ROUND CHERRY,XR,ST,5/PK: Brand: MEDLINE

## (undated) DEVICE — GAUZE,SPONGE,4"X4",8PLY,STRL,LF,10/TRAY: Brand: MEDLINE

## (undated) DEVICE — COVER HNDL LT DISP

## (undated) DEVICE — CLAMP ALLIS REG

## (undated) DEVICE — 3M™ TEGADERM™ TRANSPARENT FILM DRESSING FRAME STYLE, 1626W, 4 IN X 4-3/4 IN (10 CM X 12 CM), 50/CT 4CT/CASE: Brand: 3M™ TEGADERM™

## (undated) DEVICE — CONTROL SYRINGE LUER-LOCK TIP: Brand: MONOJECT

## (undated) DEVICE — SOLUTION IV IRRIG POUR BRL 0.9% SODIUM CHL 2F7124

## (undated) DEVICE — 1000 S-DRAPE TOWEL DRAPE 10/BX: Brand: STERI-DRAPE™

## (undated) DEVICE — SUPPORT FACE L FOR 27IN EAR CHEEK CHIN E FOR FACIOPLASTY

## (undated) DEVICE — Device

## (undated) DEVICE — 4-PORT MANIFOLD: Brand: NEPTUNE 2

## (undated) DEVICE — SHEARS ENDOSCP L9CM CRV HARM FOCS +

## (undated) DEVICE — PACK PROCEDURE SURG GEN CUST

## (undated) DEVICE — PATIENT RETURN ELECTRODE, SINGLE-USE, CONTACT QUALITY MONITORING, ADULT, WITH 9FT CORD, FOR PATIENTS WEIGING OVER 33LBS. (15KG): Brand: MEGADYNE

## (undated) DEVICE — MAGNETIC INSTR DRAPE 20X16: Brand: MEDLINE INDUSTRIES, INC.